# Patient Record
Sex: FEMALE | Race: WHITE | NOT HISPANIC OR LATINO | Employment: OTHER | URBAN - METROPOLITAN AREA
[De-identification: names, ages, dates, MRNs, and addresses within clinical notes are randomized per-mention and may not be internally consistent; named-entity substitution may affect disease eponyms.]

---

## 2017-05-31 ENCOUNTER — HISTORICAL (OUTPATIENT)
Dept: ADMINISTRATIVE | Facility: HOSPITAL | Age: 72
End: 2017-05-31

## 2017-05-31 LAB
ABS NEUT (OLG): 3.83 X10(3)/MCL (ref 2.1–9.2)
ALBUMIN SERPL-MCNC: 3.8 GM/DL (ref 3.4–5)
ALBUMIN/GLOB SERPL: 1.2 {RATIO}
ALP SERPL-CCNC: 95 UNIT/L (ref 38–126)
ALT SERPL-CCNC: 24 UNIT/L (ref 12–78)
APPEARANCE, UA: CLEAR
AST SERPL-CCNC: 14 UNIT/L (ref 15–37)
BACTERIA SPEC CULT: ABNORMAL /HPF
BILIRUB SERPL-MCNC: 0.6 MG/DL (ref 0.2–1)
BILIRUB UR QL STRIP: NEGATIVE
BILIRUBIN DIRECT+TOT PNL SERPL-MCNC: 0.1 MG/DL (ref 0–0.2)
BILIRUBIN DIRECT+TOT PNL SERPL-MCNC: 0.5 MG/DL (ref 0–0.8)
BUN SERPL-MCNC: 17 MG/DL (ref 7–18)
CALCIUM SERPL-MCNC: 9.5 MG/DL (ref 8.5–10.1)
CHLORIDE SERPL-SCNC: 105 MMOL/L (ref 98–107)
CHOLEST SERPL-MCNC: 225 MG/DL (ref 0–200)
CHOLEST/HDLC SERPL: 4.4 {RATIO} (ref 0–4)
CO2 SERPL-SCNC: 28 MMOL/L (ref 21–32)
COLOR UR: YELLOW
CREAT SERPL-MCNC: 0.85 MG/DL (ref 0.55–1.02)
DEPRECATED CALCIDIOL+CALCIFEROL SERPL-MC: 73.38 NG/ML (ref 30–80)
EOSINOPHIL NFR BLD MANUAL: 6 % (ref 0–8)
ERYTHROCYTE [DISTWIDTH] IN BLOOD BY AUTOMATED COUNT: 12.1 % (ref 11.5–17)
GLOBULIN SER-MCNC: 3.3 GM/DL (ref 2.4–3.5)
GLUCOSE (UA): NEGATIVE
GLUCOSE SERPL-MCNC: 94 MG/DL (ref 74–106)
HCT VFR BLD AUTO: 47.5 % (ref 37–47)
HDLC SERPL-MCNC: 51 MG/DL (ref 35–60)
HGB BLD-MCNC: 15.4 GM/DL (ref 12–16)
HGB UR QL STRIP: NEGATIVE
KETONES UR QL STRIP: NEGATIVE
LDLC SERPL CALC-MCNC: 142 MG/DL (ref 0–129)
LEUKOCYTE ESTERASE UR QL STRIP: ABNORMAL
LYMPHOCYTES NFR BLD MANUAL: 19 % (ref 13–40)
LYMPHOCYTES NFR BLD MANUAL: 9 %
MCH RBC QN AUTO: 31.5 PG (ref 27–31)
MCHC RBC AUTO-ENTMCNC: 32.4 GM/DL (ref 33–36)
MCV RBC AUTO: 97.1 FL (ref 80–94)
MONOCYTES NFR BLD MANUAL: 7 % (ref 2–11)
NEUTROPHILS NFR BLD MANUAL: 59 % (ref 47–80)
NITRITE UR QL STRIP: NEGATIVE
PH UR STRIP: 7.5 [PH] (ref 5–9)
PLATELET # BLD AUTO: 285 X10(3)/MCL (ref 130–400)
PLATELET # BLD EST: ABNORMAL 10*3/UL
PMV BLD AUTO: 10 FL (ref 7.4–10.4)
POTASSIUM SERPL-SCNC: 5.1 MMOL/L (ref 3.5–5.1)
PROT SERPL-MCNC: 7.1 GM/DL (ref 6.4–8.2)
PROT UR QL STRIP: NEGATIVE
RBC # BLD AUTO: 4.89 X10(6)/MCL (ref 4.2–5.4)
RBC #/AREA URNS HPF: ABNORMAL /HPF
SODIUM SERPL-SCNC: 141 MMOL/L (ref 136–145)
SP GR UR STRIP: 1.02 (ref 1–1.03)
SQUAMOUS EPITHELIAL, UA: ABNORMAL
TRIGL SERPL-MCNC: 161 MG/DL (ref 30–150)
TSH SERPL-ACNC: 1.63 MIU/ML (ref 0.36–3.74)
UA WBC MAN: ABNORMAL /HPF
UROBILINOGEN UR STRIP-ACNC: 0.2
VLDLC SERPL CALC-MCNC: 32 MG/DL
WBC # SPEC AUTO: 7.5 X10(3)/MCL (ref 4.5–11.5)

## 2017-06-14 ENCOUNTER — HISTORICAL (OUTPATIENT)
Dept: RADIOLOGY | Facility: HOSPITAL | Age: 72
End: 2017-06-14

## 2017-06-28 ENCOUNTER — HISTORICAL (OUTPATIENT)
Dept: ADMINISTRATIVE | Facility: HOSPITAL | Age: 72
End: 2017-06-28

## 2017-07-18 ENCOUNTER — HISTORICAL (OUTPATIENT)
Dept: ADMINISTRATIVE | Facility: HOSPITAL | Age: 72
End: 2017-07-18

## 2017-12-01 ENCOUNTER — HISTORICAL (OUTPATIENT)
Dept: ADMINISTRATIVE | Facility: HOSPITAL | Age: 72
End: 2017-12-01

## 2017-12-01 LAB
ABS NEUT (OLG): 3.76 X10(3)/MCL (ref 2.1–9.2)
ALBUMIN SERPL-MCNC: 3.8 GM/DL (ref 3.4–5)
ALBUMIN/GLOB SERPL: 1.1 {RATIO}
ALP SERPL-CCNC: 97 UNIT/L (ref 38–126)
ALT SERPL-CCNC: 39 UNIT/L (ref 12–78)
APPEARANCE, UA: CLEAR
AST SERPL-CCNC: 16 UNIT/L (ref 15–37)
BACTERIA SPEC CULT: NORMAL /HPF
BASOPHILS # BLD AUTO: 0 X10(3)/MCL (ref 0–0.2)
BASOPHILS NFR BLD AUTO: 1 %
BILIRUB SERPL-MCNC: 0.5 MG/DL (ref 0.2–1)
BILIRUB UR QL STRIP: NEGATIVE
BILIRUBIN DIRECT+TOT PNL SERPL-MCNC: 0.1 MG/DL (ref 0–0.2)
BILIRUBIN DIRECT+TOT PNL SERPL-MCNC: 0.4 MG/DL (ref 0–0.8)
BUN SERPL-MCNC: 17 MG/DL (ref 7–18)
CALCIUM SERPL-MCNC: 9.3 MG/DL (ref 8.5–10.1)
CHLORIDE SERPL-SCNC: 104 MMOL/L (ref 98–107)
CHOLEST SERPL-MCNC: 260 MG/DL (ref 0–200)
CHOLEST/HDLC SERPL: 4.7 {RATIO} (ref 0–4)
CO2 SERPL-SCNC: 28 MMOL/L (ref 21–32)
COLOR UR: YELLOW
CREAT SERPL-MCNC: 0.79 MG/DL (ref 0.55–1.02)
EOSINOPHIL # BLD AUTO: 0.1 X10(3)/MCL (ref 0–0.9)
EOSINOPHIL NFR BLD AUTO: 2 %
ERYTHROCYTE [DISTWIDTH] IN BLOOD BY AUTOMATED COUNT: 12.3 % (ref 11.5–17)
GLOBULIN SER-MCNC: 3.6 GM/DL (ref 2.4–3.5)
GLUCOSE (UA): NEGATIVE
GLUCOSE SERPL-MCNC: 95 MG/DL (ref 74–106)
HCT VFR BLD AUTO: 46 % (ref 37–47)
HDLC SERPL-MCNC: 55 MG/DL (ref 35–60)
HGB BLD-MCNC: 15.2 GM/DL (ref 12–16)
HGB UR QL STRIP: NEGATIVE
KETONES UR QL STRIP: NEGATIVE
LDLC SERPL CALC-MCNC: 173 MG/DL (ref 0–129)
LEUKOCYTE ESTERASE UR QL STRIP: NEGATIVE
LYMPHOCYTES # BLD AUTO: 2.5 X10(3)/MCL (ref 0.6–4.6)
LYMPHOCYTES NFR BLD AUTO: 36 %
MCH RBC QN AUTO: 31.2 PG (ref 27–31)
MCHC RBC AUTO-ENTMCNC: 33 GM/DL (ref 33–36)
MCV RBC AUTO: 94.5 FL (ref 80–94)
MONOCYTES # BLD AUTO: 0.6 X10(3)/MCL (ref 0.1–1.3)
MONOCYTES NFR BLD AUTO: 9 %
NEUTROPHILS # BLD AUTO: 3.76 X10(3)/MCL (ref 2.1–9.2)
NEUTROPHILS NFR BLD AUTO: 53 %
NITRITE UR QL STRIP: NEGATIVE
PH UR STRIP: 7.5 [PH] (ref 5–9)
PLATELET # BLD AUTO: 262 X10(3)/MCL (ref 130–400)
PMV BLD AUTO: 9.6 FL (ref 9.4–12.4)
POTASSIUM SERPL-SCNC: 4.7 MMOL/L (ref 3.5–5.1)
PROT SERPL-MCNC: 7.4 GM/DL (ref 6.4–8.2)
PROT UR QL STRIP: NEGATIVE
RBC # BLD AUTO: 4.87 X10(6)/MCL (ref 4.2–5.4)
RBC #/AREA URNS HPF: NORMAL /[HPF]
SODIUM SERPL-SCNC: 139 MMOL/L (ref 136–145)
SP GR UR STRIP: 1.01 (ref 1–1.03)
SQUAMOUS EPITHELIAL, UA: NORMAL
T4 FREE SERPL-MCNC: 1 NG/DL (ref 0.76–1.46)
TRIGL SERPL-MCNC: 158 MG/DL (ref 30–150)
TSH SERPL-ACNC: 2.17 MIU/ML (ref 0.36–3.74)
UROBILINOGEN UR STRIP-ACNC: 1
VLDLC SERPL CALC-MCNC: 32 MG/DL
WBC # SPEC AUTO: 7.1 X10(3)/MCL (ref 4.5–11.5)
WBC #/AREA URNS HPF: NORMAL /[HPF]

## 2017-12-11 ENCOUNTER — HISTORICAL (OUTPATIENT)
Dept: RADIOLOGY | Facility: HOSPITAL | Age: 72
End: 2017-12-11

## 2018-06-04 ENCOUNTER — HISTORICAL (OUTPATIENT)
Dept: ADMINISTRATIVE | Facility: HOSPITAL | Age: 73
End: 2018-06-04

## 2018-06-04 LAB
ABS NEUT (OLG): 3.56 X10(3)/MCL (ref 2.1–9.2)
ALBUMIN SERPL-MCNC: 3.9 GM/DL (ref 3.4–5)
ALBUMIN/GLOB SERPL: 1.1 {RATIO}
ALP SERPL-CCNC: 93 UNIT/L (ref 38–126)
ALT SERPL-CCNC: 31 UNIT/L (ref 12–78)
AST SERPL-CCNC: 16 UNIT/L (ref 15–37)
BASOPHILS # BLD AUTO: 0 X10(3)/MCL (ref 0–0.2)
BASOPHILS NFR BLD AUTO: 1 %
BILIRUB SERPL-MCNC: 0.6 MG/DL (ref 0.2–1)
BILIRUBIN DIRECT+TOT PNL SERPL-MCNC: 0.1 MG/DL (ref 0–0.2)
BILIRUBIN DIRECT+TOT PNL SERPL-MCNC: 0.5 MG/DL (ref 0–0.8)
BUN SERPL-MCNC: 15 MG/DL (ref 7–18)
CALCIUM SERPL-MCNC: 9.2 MG/DL (ref 8.5–10.1)
CHLORIDE SERPL-SCNC: 108 MMOL/L (ref 98–107)
CHOLEST SERPL-MCNC: 181 MG/DL (ref 0–200)
CHOLEST/HDLC SERPL: 3.4 {RATIO} (ref 0–4)
CO2 SERPL-SCNC: 28 MMOL/L (ref 21–32)
CREAT SERPL-MCNC: 0.78 MG/DL (ref 0.55–1.02)
EOSINOPHIL # BLD AUTO: 0.1 X10(3)/MCL (ref 0–0.9)
EOSINOPHIL NFR BLD AUTO: 2 %
ERYTHROCYTE [DISTWIDTH] IN BLOOD BY AUTOMATED COUNT: 12 % (ref 11.5–17)
GLOBULIN SER-MCNC: 3.4 GM/DL (ref 2.4–3.5)
GLUCOSE SERPL-MCNC: 88 MG/DL (ref 74–106)
HCT VFR BLD AUTO: 46.7 % (ref 37–47)
HDLC SERPL-MCNC: 53 MG/DL (ref 35–60)
HGB BLD-MCNC: 15.2 GM/DL (ref 12–16)
LDLC SERPL CALC-MCNC: 107 MG/DL (ref 0–129)
LYMPHOCYTES # BLD AUTO: 3.2 X10(3)/MCL (ref 0.6–4.6)
LYMPHOCYTES NFR BLD AUTO: 42 %
MCH RBC QN AUTO: 31.7 PG (ref 27–31)
MCHC RBC AUTO-ENTMCNC: 32.5 GM/DL (ref 33–36)
MCV RBC AUTO: 97.3 FL (ref 80–94)
MONOCYTES # BLD AUTO: 0.5 X10(3)/MCL (ref 0.1–1.3)
MONOCYTES NFR BLD AUTO: 7 %
NEUTROPHILS # BLD AUTO: 3.56 X10(3)/MCL (ref 1.4–7.9)
NEUTROPHILS NFR BLD AUTO: 48 %
PLATELET # BLD AUTO: 285 X10(3)/MCL (ref 130–400)
PMV BLD AUTO: 9.7 FL (ref 9.4–12.4)
POTASSIUM SERPL-SCNC: 4.9 MMOL/L (ref 3.5–5.1)
PROT SERPL-MCNC: 7.3 GM/DL (ref 6.4–8.2)
RBC # BLD AUTO: 4.8 X10(6)/MCL (ref 4.2–5.4)
SODIUM SERPL-SCNC: 142 MMOL/L (ref 136–145)
T4 FREE SERPL-MCNC: 0.84 NG/DL (ref 0.76–1.46)
TRIGL SERPL-MCNC: 105 MG/DL (ref 30–150)
TSH SERPL-ACNC: 0.86 MIU/L (ref 0.36–3.74)
VLDLC SERPL CALC-MCNC: 21 MG/DL
WBC # SPEC AUTO: 7.4 X10(3)/MCL (ref 4.5–11.5)

## 2018-09-07 ENCOUNTER — HISTORICAL (OUTPATIENT)
Dept: RADIOLOGY | Facility: HOSPITAL | Age: 73
End: 2018-09-07

## 2018-09-07 LAB — BMD RECOMMENDATION EXT: NORMAL

## 2018-12-04 ENCOUNTER — HISTORICAL (OUTPATIENT)
Dept: ADMINISTRATIVE | Facility: HOSPITAL | Age: 73
End: 2018-12-04

## 2018-12-04 LAB
ABS NEUT (OLG): 4.18 X10(3)/MCL (ref 2.1–9.2)
ALBUMIN SERPL-MCNC: 4 GM/DL (ref 3.4–5)
ALBUMIN/GLOB SERPL: 1.1 {RATIO}
ALP SERPL-CCNC: 91 UNIT/L (ref 38–126)
ALT SERPL-CCNC: 30 UNIT/L (ref 12–78)
APPEARANCE, UA: CLEAR
AST SERPL-CCNC: 18 UNIT/L (ref 15–37)
BACTERIA SPEC CULT: ABNORMAL /HPF
BASOPHILS # BLD AUTO: 0.1 X10(3)/MCL (ref 0–0.2)
BASOPHILS NFR BLD AUTO: 1 %
BILIRUB SERPL-MCNC: 0.8 MG/DL (ref 0.2–1)
BILIRUB UR QL STRIP: NEGATIVE
BILIRUBIN DIRECT+TOT PNL SERPL-MCNC: 0.2 MG/DL (ref 0–0.2)
BILIRUBIN DIRECT+TOT PNL SERPL-MCNC: 0.6 MG/DL (ref 0–0.8)
BUN SERPL-MCNC: 13 MG/DL (ref 7–18)
CALCIUM SERPL-MCNC: 9.4 MG/DL (ref 8.5–10.1)
CHLORIDE SERPL-SCNC: 104 MMOL/L (ref 98–107)
CHOLEST SERPL-MCNC: 184 MG/DL (ref 0–200)
CHOLEST/HDLC SERPL: 3.1 {RATIO} (ref 0–4)
CO2 SERPL-SCNC: 27 MMOL/L (ref 21–32)
COLOR UR: YELLOW
CREAT SERPL-MCNC: 0.89 MG/DL (ref 0.55–1.02)
EOSINOPHIL # BLD AUTO: 0.1 X10(3)/MCL (ref 0–0.9)
EOSINOPHIL NFR BLD AUTO: 2 %
ERYTHROCYTE [DISTWIDTH] IN BLOOD BY AUTOMATED COUNT: 12.1 % (ref 11.5–17)
GLOBULIN SER-MCNC: 3.5 GM/DL (ref 2.4–3.5)
GLUCOSE (UA): NEGATIVE
GLUCOSE SERPL-MCNC: 90 MG/DL (ref 74–106)
HCT VFR BLD AUTO: 47.2 % (ref 37–47)
HDLC SERPL-MCNC: 60 MG/DL (ref 35–60)
HGB BLD-MCNC: 15.3 GM/DL (ref 12–16)
HGB UR QL STRIP: NEGATIVE
KETONES UR QL STRIP: NEGATIVE
LDLC SERPL CALC-MCNC: 100 MG/DL (ref 0–129)
LEUKOCYTE ESTERASE UR QL STRIP: ABNORMAL
LYMPHOCYTES # BLD AUTO: 3.6 X10(3)/MCL (ref 0.6–4.6)
LYMPHOCYTES NFR BLD AUTO: 42 %
MCH RBC QN AUTO: 31.7 PG (ref 27–31)
MCHC RBC AUTO-ENTMCNC: 32.4 GM/DL (ref 33–36)
MCV RBC AUTO: 97.7 FL (ref 80–94)
MONOCYTES # BLD AUTO: 0.6 X10(3)/MCL (ref 0.1–1.3)
MONOCYTES NFR BLD AUTO: 7 %
NEUTROPHILS # BLD AUTO: 4.18 X10(3)/MCL (ref 2.1–9.2)
NEUTROPHILS NFR BLD AUTO: 49 %
NITRITE UR QL STRIP: NEGATIVE
PH UR STRIP: 8.5 [PH] (ref 5–9)
PLATELET # BLD AUTO: 274 X10(3)/MCL (ref 130–400)
PMV BLD AUTO: 9.2 FL (ref 9.4–12.4)
POTASSIUM SERPL-SCNC: 4.1 MMOL/L (ref 3.5–5.1)
PROT SERPL-MCNC: 7.5 GM/DL (ref 6.4–8.2)
PROT UR QL STRIP: NEGATIVE
RBC # BLD AUTO: 4.83 X10(6)/MCL (ref 4.2–5.4)
RBC #/AREA URNS HPF: ABNORMAL /[HPF]
SODIUM SERPL-SCNC: 139 MMOL/L (ref 136–145)
SP GR UR STRIP: 1.01 (ref 1–1.03)
SQUAMOUS EPITHELIAL, UA: ABNORMAL
T4 FREE SERPL-MCNC: 0.92 NG/DL (ref 0.76–1.46)
TRIGL SERPL-MCNC: 122 MG/DL (ref 30–150)
TSH SERPL-ACNC: 1.97 MIU/L (ref 0.36–3.74)
UROBILINOGEN UR STRIP-ACNC: 0.2
VLDLC SERPL CALC-MCNC: 24 MG/DL
WBC # SPEC AUTO: 8.6 X10(3)/MCL (ref 4.5–11.5)
WBC #/AREA URNS HPF: ABNORMAL /[HPF]

## 2019-01-15 ENCOUNTER — HISTORICAL (OUTPATIENT)
Dept: ADMINISTRATIVE | Facility: HOSPITAL | Age: 74
End: 2019-01-15

## 2019-03-07 ENCOUNTER — HISTORICAL (OUTPATIENT)
Dept: ADMINISTRATIVE | Facility: HOSPITAL | Age: 74
End: 2019-03-07

## 2019-03-25 ENCOUNTER — HISTORICAL (OUTPATIENT)
Dept: RADIOLOGY | Facility: HOSPITAL | Age: 74
End: 2019-03-25

## 2019-06-13 ENCOUNTER — HISTORICAL (OUTPATIENT)
Dept: ADMINISTRATIVE | Facility: HOSPITAL | Age: 74
End: 2019-06-13

## 2019-06-13 LAB
ABS NEUT (OLG): 2.9 X10(3)/MCL (ref 2.1–9.2)
ALBUMIN SERPL-MCNC: 3.7 GM/DL (ref 3.4–5)
ALBUMIN/GLOB SERPL: 1.1 RATIO (ref 1.1–2)
ALP SERPL-CCNC: 92 UNIT/L (ref 38–126)
ALT SERPL-CCNC: 19 UNIT/L (ref 12–78)
APPEARANCE, UA: CLEAR
AST SERPL-CCNC: 13 UNIT/L (ref 15–37)
BACTERIA SPEC CULT: ABNORMAL /HPF
BASOPHILS # BLD AUTO: 0 X10(3)/MCL (ref 0–0.2)
BASOPHILS NFR BLD AUTO: 1 %
BILIRUB SERPL-MCNC: 0.5 MG/DL (ref 0.2–1)
BILIRUB UR QL STRIP: NEGATIVE
BILIRUBIN DIRECT+TOT PNL SERPL-MCNC: 0.1 MG/DL (ref 0–0.5)
BILIRUBIN DIRECT+TOT PNL SERPL-MCNC: 0.4 MG/DL (ref 0–0.8)
BUN SERPL-MCNC: 11 MG/DL (ref 7–18)
CALCIUM SERPL-MCNC: 9.7 MG/DL (ref 8.5–10.1)
CHLORIDE SERPL-SCNC: 108 MMOL/L (ref 98–107)
CHOLEST SERPL-MCNC: 137 MG/DL (ref 0–200)
CHOLEST/HDLC SERPL: 2.6 {RATIO} (ref 0–4)
CO2 SERPL-SCNC: 28 MMOL/L (ref 21–32)
COLOR UR: YELLOW
CREAT SERPL-MCNC: 0.73 MG/DL (ref 0.55–1.02)
EOSINOPHIL # BLD AUTO: 0.2 X10(3)/MCL (ref 0–0.9)
EOSINOPHIL NFR BLD AUTO: 2 %
ERYTHROCYTE [DISTWIDTH] IN BLOOD BY AUTOMATED COUNT: 12.4 % (ref 11.5–17)
GLOBULIN SER-MCNC: 3.3 GM/DL (ref 2.4–3.5)
GLUCOSE (UA): NEGATIVE
GLUCOSE SERPL-MCNC: 91 MG/DL (ref 74–106)
HCT VFR BLD AUTO: 44.5 % (ref 37–47)
HDLC SERPL-MCNC: 53 MG/DL (ref 35–60)
HGB BLD-MCNC: 14.2 GM/DL (ref 12–16)
HGB UR QL STRIP: NEGATIVE
KETONES UR QL STRIP: NEGATIVE
LDLC SERPL CALC-MCNC: 65 MG/DL (ref 0–129)
LEUKOCYTE ESTERASE UR QL STRIP: ABNORMAL
LYMPHOCYTES # BLD AUTO: 2.6 X10(3)/MCL (ref 0.6–4.6)
LYMPHOCYTES NFR BLD AUTO: 42 %
MCH RBC QN AUTO: 30.5 PG (ref 27–31)
MCHC RBC AUTO-ENTMCNC: 31.9 GM/DL (ref 33–36)
MCV RBC AUTO: 95.7 FL (ref 80–94)
MONOCYTES # BLD AUTO: 0.5 X10(3)/MCL (ref 0.1–1.3)
MONOCYTES NFR BLD AUTO: 8 %
NEUTROPHILS # BLD AUTO: 2.9 X10(3)/MCL (ref 2.1–9.2)
NEUTROPHILS NFR BLD AUTO: 46 %
NITRITE UR QL STRIP: NEGATIVE
PH UR STRIP: 6.5 [PH] (ref 5–9)
PLATELET # BLD AUTO: 273 X10(3)/MCL (ref 130–400)
PMV BLD AUTO: 9.8 FL (ref 9.4–12.4)
POTASSIUM SERPL-SCNC: 4.4 MMOL/L (ref 3.5–5.1)
PROT SERPL-MCNC: 7 GM/DL (ref 6.4–8.2)
PROT UR QL STRIP: NEGATIVE
RBC # BLD AUTO: 4.65 X10(6)/MCL (ref 4.2–5.4)
RBC #/AREA URNS HPF: ABNORMAL /[HPF]
SODIUM SERPL-SCNC: 141 MMOL/L (ref 136–145)
SP GR UR STRIP: 1.02 (ref 1–1.03)
SQUAMOUS EPITHELIAL, UA: ABNORMAL
T4 FREE SERPL-MCNC: 0.92 NG/DL (ref 0.76–1.46)
TRIGL SERPL-MCNC: 97 MG/DL (ref 30–150)
TSH SERPL-ACNC: 1.12 MIU/L (ref 0.36–3.74)
UROBILINOGEN UR STRIP-ACNC: 1
VLDLC SERPL CALC-MCNC: 19 MG/DL
WBC # SPEC AUTO: 6.2 X10(3)/MCL (ref 4.5–11.5)
WBC #/AREA URNS HPF: ABNORMAL /[HPF]

## 2019-12-16 ENCOUNTER — HISTORICAL (OUTPATIENT)
Dept: ADMINISTRATIVE | Facility: HOSPITAL | Age: 74
End: 2019-12-16

## 2019-12-16 LAB
ABS NEUT (OLG): 3.57 X10(3)/MCL (ref 2.1–9.2)
ALBUMIN SERPL-MCNC: 3.4 GM/DL (ref 3.4–5)
ALBUMIN/GLOB SERPL: 1 RATIO (ref 1.1–2)
ALP SERPL-CCNC: 101 UNIT/L (ref 38–126)
ALT SERPL-CCNC: 25 UNIT/L (ref 12–78)
AST SERPL-CCNC: 12 UNIT/L (ref 15–37)
BASOPHILS # BLD AUTO: 0 X10(3)/MCL (ref 0–0.2)
BASOPHILS NFR BLD AUTO: 1 %
BILIRUB SERPL-MCNC: 0.5 MG/DL (ref 0.2–1)
BILIRUBIN DIRECT+TOT PNL SERPL-MCNC: 0.1 MG/DL (ref 0–0.5)
BILIRUBIN DIRECT+TOT PNL SERPL-MCNC: 0.4 MG/DL (ref 0–0.8)
BUN SERPL-MCNC: 15 MG/DL (ref 7–18)
CALCIUM SERPL-MCNC: 9.4 MG/DL (ref 8.5–10.1)
CHLORIDE SERPL-SCNC: 108 MMOL/L (ref 98–107)
CHOLEST SERPL-MCNC: 132 MG/DL (ref 0–200)
CHOLEST/HDLC SERPL: 2.5 {RATIO} (ref 0–4)
CO2 SERPL-SCNC: 27 MMOL/L (ref 21–32)
CREAT SERPL-MCNC: 0.85 MG/DL (ref 0.55–1.02)
EOSINOPHIL # BLD AUTO: 0.1 X10(3)/MCL (ref 0–0.9)
EOSINOPHIL NFR BLD AUTO: 2 %
ERYTHROCYTE [DISTWIDTH] IN BLOOD BY AUTOMATED COUNT: 12.3 % (ref 11.5–17)
GLOBULIN SER-MCNC: 3.3 GM/DL (ref 2.4–3.5)
GLUCOSE SERPL-MCNC: 101 MG/DL (ref 74–106)
HCT VFR BLD AUTO: 45.7 % (ref 37–47)
HDLC SERPL-MCNC: 52 MG/DL (ref 35–60)
HGB BLD-MCNC: 14.7 GM/DL (ref 12–16)
IMM GRANULOCYTES # BLD AUTO: 0 10*3/UL
IMM GRANULOCYTES NFR BLD AUTO: 0 %
LDLC SERPL CALC-MCNC: 62 MG/DL (ref 0–129)
LYMPHOCYTES # BLD AUTO: 2.6 X10(3)/MCL (ref 0.6–4.6)
LYMPHOCYTES NFR BLD AUTO: 38 %
MCH RBC QN AUTO: 30.8 PG (ref 27–31)
MCHC RBC AUTO-ENTMCNC: 32.2 GM/DL (ref 33–36)
MCV RBC AUTO: 95.6 FL (ref 80–94)
MONOCYTES # BLD AUTO: 0.5 X10(3)/MCL (ref 0.1–1.3)
MONOCYTES NFR BLD AUTO: 7 %
NEUTROPHILS # BLD AUTO: 3.57 X10(3)/MCL (ref 2.1–9.2)
NEUTROPHILS NFR BLD AUTO: 53 %
PLATELET # BLD AUTO: 302 X10(3)/MCL (ref 130–400)
PMV BLD AUTO: 9.6 FL (ref 9.4–12.4)
POTASSIUM SERPL-SCNC: 4.8 MMOL/L (ref 3.5–5.1)
PROT SERPL-MCNC: 6.7 GM/DL (ref 6.4–8.2)
RBC # BLD AUTO: 4.78 X10(6)/MCL (ref 4.2–5.4)
SODIUM SERPL-SCNC: 141 MMOL/L (ref 136–145)
T4 FREE SERPL-MCNC: 0.86 NG/DL (ref 0.76–1.46)
TRIGL SERPL-MCNC: 91 MG/DL (ref 30–150)
TSH SERPL-ACNC: 1.78 MIU/L (ref 0.36–3.74)
VLDLC SERPL CALC-MCNC: 18 MG/DL
WBC # SPEC AUTO: 6.8 X10(3)/MCL (ref 4.5–11.5)

## 2020-06-15 ENCOUNTER — HISTORICAL (OUTPATIENT)
Dept: ADMINISTRATIVE | Facility: HOSPITAL | Age: 75
End: 2020-06-15

## 2020-06-15 LAB
ABS NEUT (OLG): 3.64 X10(3)/MCL (ref 2.1–9.2)
ALBUMIN SERPL-MCNC: 4.1 GM/DL (ref 3.4–4.8)
ALBUMIN/GLOB SERPL: 1.4 RATIO (ref 1.1–2)
ALP SERPL-CCNC: 99 UNIT/L (ref 40–150)
ALT SERPL-CCNC: 23 UNIT/L (ref 0–55)
APPEARANCE, UA: CLEAR
AST SERPL-CCNC: 23 UNIT/L (ref 5–34)
BACTERIA SPEC CULT: ABNORMAL /HPF
BASOPHILS # BLD AUTO: 0 X10(3)/MCL (ref 0–0.2)
BASOPHILS NFR BLD AUTO: 0 %
BILIRUB SERPL-MCNC: 0.7 MG/DL
BILIRUB UR QL STRIP: NEGATIVE
BILIRUBIN DIRECT+TOT PNL SERPL-MCNC: 0.3 MG/DL (ref 0–0.5)
BILIRUBIN DIRECT+TOT PNL SERPL-MCNC: 0.4 MG/DL (ref 0–0.8)
BUN SERPL-MCNC: 10.4 MG/DL (ref 9.8–20.1)
CALCIUM SERPL-MCNC: 9.5 MG/DL (ref 8.4–10.2)
CHLORIDE SERPL-SCNC: 106 MMOL/L (ref 98–107)
CHOLEST SERPL-MCNC: 159 MG/DL
CHOLEST/HDLC SERPL: 3 {RATIO} (ref 0–5)
CO2 SERPL-SCNC: 28 MMOL/L (ref 23–31)
COLOR UR: YELLOW
CREAT SERPL-MCNC: 0.79 MG/DL (ref 0.55–1.02)
EOSINOPHIL # BLD AUTO: 0.1 X10(3)/MCL (ref 0–0.9)
EOSINOPHIL NFR BLD AUTO: 2 %
ERYTHROCYTE [DISTWIDTH] IN BLOOD BY AUTOMATED COUNT: 12.2 % (ref 11.5–17)
GLOBULIN SER-MCNC: 3 GM/DL (ref 2.4–3.5)
GLUCOSE (UA): NEGATIVE
GLUCOSE SERPL-MCNC: 95 MG/DL (ref 82–115)
HCT VFR BLD AUTO: 46 % (ref 37–47)
HDLC SERPL-MCNC: 48 MG/DL (ref 35–60)
HGB BLD-MCNC: 15 GM/DL (ref 12–16)
HGB UR QL STRIP: NEGATIVE
KETONES UR QL STRIP: NEGATIVE
LDLC SERPL CALC-MCNC: 85 MG/DL (ref 50–140)
LEUKOCYTE ESTERASE UR QL STRIP: ABNORMAL
LYMPHOCYTES # BLD AUTO: 3.2 X10(3)/MCL (ref 0.6–4.6)
LYMPHOCYTES NFR BLD AUTO: 43 %
MCH RBC QN AUTO: 31.4 PG (ref 27–31)
MCHC RBC AUTO-ENTMCNC: 32.6 GM/DL (ref 33–36)
MCV RBC AUTO: 96.2 FL (ref 80–94)
MONOCYTES # BLD AUTO: 0.4 X10(3)/MCL (ref 0.1–1.3)
MONOCYTES NFR BLD AUTO: 6 %
NEUTROPHILS # BLD AUTO: 3.64 X10(3)/MCL (ref 2.1–9.2)
NEUTROPHILS NFR BLD AUTO: 49 %
NITRITE UR QL STRIP: NEGATIVE
PH UR STRIP: >=9 [PH] (ref 5–9)
PLATELET # BLD AUTO: 266 X10(3)/MCL (ref 130–400)
PMV BLD AUTO: 9.5 FL (ref 9.4–12.4)
POTASSIUM SERPL-SCNC: 4.5 MMOL/L (ref 3.5–5.1)
PROT SERPL-MCNC: 7.1 GM/DL (ref 5.8–7.6)
PROT UR QL STRIP: NEGATIVE
RBC # BLD AUTO: 4.78 X10(6)/MCL (ref 4.2–5.4)
RBC #/AREA URNS HPF: ABNORMAL /[HPF]
SODIUM SERPL-SCNC: 141 MMOL/L (ref 136–145)
SP GR UR STRIP: 1.02 (ref 1–1.03)
SQUAMOUS EPITHELIAL, UA: ABNORMAL
T4 FREE SERPL-MCNC: 0.82 NG/DL (ref 0.7–1.48)
TRIGL SERPL-MCNC: 130 MG/DL (ref 37–140)
TSH SERPL-ACNC: 1.92 UIU/ML (ref 0.35–4.94)
UROBILINOGEN UR STRIP-ACNC: 1
VLDLC SERPL CALC-MCNC: 26 MG/DL
WBC # SPEC AUTO: 7.4 X10(3)/MCL (ref 4.5–11.5)
WBC #/AREA URNS HPF: 23 /HPF (ref 0–3)

## 2020-06-17 LAB — FINAL CULTURE: NORMAL

## 2021-01-05 ENCOUNTER — HISTORICAL (OUTPATIENT)
Dept: LAB | Facility: HOSPITAL | Age: 76
End: 2021-01-05

## 2021-01-05 LAB
ABS NEUT (OLG): 4.4
APPEARANCE, UA: CLEAR
BACTERIA SPEC CULT: ABNORMAL
BASOPHILS # BLD AUTO: 0.04 X10(3)/MCL
BASOPHILS NFR BLD AUTO: 0.5 %
BILIRUB UR QL STRIP: NEGATIVE
CHOLEST SERPL-MCNC: 187 MG/DL
CHOLEST/HDLC SERPL: 4 {RATIO} (ref 0–5)
COLOR UR: YELLOW
EOSINOPHIL # BLD AUTO: 0.16 X10(3)/MCL
EOSINOPHIL NFR BLD AUTO: 1.9 %
ERYTHROCYTE [DISTWIDTH] IN BLOOD BY AUTOMATED COUNT: 13 %
GLUCOSE (UA): NEGATIVE
HCT VFR BLD AUTO: 48.8 % (ref 34–46)
HDLC SERPL-MCNC: 47 MG/DL (ref 35–60)
HGB BLD-MCNC: 15.6 GM/DL (ref 11.3–15.4)
HGB UR QL STRIP: NEGATIVE
IMM GRANULOCYTES # BLD AUTO: 0.02 10*3/UL (ref 0–0.1)
IMM GRANULOCYTES NFR BLD AUTO: 0.2 % (ref 0–1)
KETONES UR QL STRIP: NEGATIVE
LDLC SERPL CALC-MCNC: 107 MG/DL (ref 50–140)
LEUKOCYTE ESTERASE UR QL STRIP: NEGATIVE
LYMPHOCYTES # BLD AUTO: 3.16 X10(3)/MCL
LYMPHOCYTES NFR BLD AUTO: 38.2 %
MCH RBC QN AUTO: 31.3 PG (ref 27–33)
MCHC RBC AUTO-ENTMCNC: 32 GM/DL (ref 32–35)
MCV RBC AUTO: 97.8 FL (ref 81–97)
MONOCYTES # BLD AUTO: 0.5 X10(3)/MCL
MONOCYTES NFR BLD AUTO: 6 %
NEUTROPHILS # BLD AUTO: 4.4 X10(3)/MCL
NEUTROPHILS NFR BLD AUTO: 53.2 %
NITRITE UR QL STRIP: NEGATIVE
PH UR STRIP: 8 [PH] (ref 4.6–8)
PLATELET # BLD AUTO: 290 X10(3)/MCL (ref 140–450)
PMV BLD AUTO: 10 FL
PROT UR QL STRIP: NEGATIVE
RBC # BLD AUTO: 4.99 X10(6)/MCL (ref 3.9–5)
RBC #/AREA URNS HPF: ABNORMAL /[HPF]
SP GR UR STRIP: 1.02 (ref 1–1.03)
SQUAMOUS EPITHELIAL, UA: ABNORMAL
TRIGL SERPL-MCNC: 166 MG/DL (ref 37–140)
UROBILINOGEN UR STRIP-ACNC: 0.2
VLDLC SERPL CALC-MCNC: 33 MG/DL
WBC # SPEC AUTO: 8.28 X10(3)/MCL (ref 3.4–9.2)
WBC #/AREA URNS HPF: ABNORMAL /HPF

## 2021-01-11 ENCOUNTER — HISTORICAL (OUTPATIENT)
Dept: LAB | Facility: HOSPITAL | Age: 76
End: 2021-01-11

## 2021-01-11 LAB
ALBUMIN SERPL-MCNC: 4.3 GM/DL (ref 3.4–4.8)
ALBUMIN/GLOB SERPL: 1.1 RATIO (ref 1.1–2)
ALP SERPL-CCNC: 107 UNIT/L (ref 40–150)
ALT SERPL-CCNC: 22 UNIT/L (ref 0–55)
AST SERPL-CCNC: 20 UNIT/L (ref 5–34)
BILIRUB SERPL-MCNC: 0.6 MG/DL
BILIRUBIN DIRECT+TOT PNL SERPL-MCNC: 0.3 MG/DL
BILIRUBIN DIRECT+TOT PNL SERPL-MCNC: 0.3 MG/DL (ref 0–0.5)
BUN SERPL-MCNC: 11 MG/DL (ref 9.8–20.1)
CALCIUM SERPL-MCNC: 10 MG/DL (ref 8.4–10.2)
CHLORIDE SERPL-SCNC: 105 MMOL/L (ref 98–107)
CO2 SERPL-SCNC: 27 MEQ/L (ref 23–31)
CREAT SERPL-MCNC: 0.84 MG/DL (ref 0.55–1.02)
DEPRECATED CALCIDIOL+CALCIFEROL SERPL-MC: 82.8 NG/ML (ref 30–80)
GLOBULIN SER-MCNC: 3.8 GM/DL (ref 2.4–3.5)
GLUCOSE SERPL-MCNC: 91 MG/DL (ref 82–115)
MAGNESIUM SERPL-MCNC: 2.35 MG/DL (ref 1.6–2.6)
POTASSIUM SERPL-SCNC: 4.5 MMOL/L (ref 3.5–5.1)
PROT SERPL-MCNC: 8.1 GM/DL (ref 5.8–7.6)
SODIUM SERPL-SCNC: 142 MMOL/L (ref 136–145)
TSH SERPL-ACNC: 3.05 UIU/ML (ref 0.35–4.94)
VIT B12 SERPL-MCNC: 554 PG/ML (ref 213–816)

## 2021-03-11 ENCOUNTER — HISTORICAL (OUTPATIENT)
Dept: RADIOLOGY | Facility: HOSPITAL | Age: 76
End: 2021-03-11

## 2021-03-11 LAB — BMD RECOMMENDATION EXT: NORMAL

## 2021-03-16 ENCOUNTER — HISTORICAL (OUTPATIENT)
Dept: RADIOLOGY | Facility: HOSPITAL | Age: 76
End: 2021-03-16

## 2021-03-16 LAB
ABS NEUT (OLG): 3.82
ALBUMIN SERPL-MCNC: 4.1 GM/DL (ref 3.4–4.8)
ALBUMIN/GLOB SERPL: 1.2 RATIO (ref 1.1–2)
ALP SERPL-CCNC: 89 UNIT/L (ref 40–150)
ALT SERPL-CCNC: 26 UNIT/L (ref 0–55)
APPEARANCE, UA: CLEAR
AST SERPL-CCNC: 21 UNIT/L (ref 5–34)
BACTERIA SPEC CULT: ABNORMAL
BASOPHILS # BLD AUTO: 0.03 X10(3)/MCL
BASOPHILS NFR BLD AUTO: 0.4 %
BILIRUB SERPL-MCNC: 0.5 MG/DL
BILIRUB UR QL STRIP: NEGATIVE
BILIRUBIN DIRECT+TOT PNL SERPL-MCNC: 0.2 MG/DL (ref 0–0.5)
BILIRUBIN DIRECT+TOT PNL SERPL-MCNC: 0.3 MG/DL
BUN SERPL-MCNC: 11 MG/DL (ref 9.8–20.1)
CALCIUM SERPL-MCNC: 9.7 MG/DL (ref 8.4–10.2)
CHLORIDE SERPL-SCNC: 106 MMOL/L (ref 98–107)
CO2 SERPL-SCNC: 26 MEQ/L (ref 23–31)
COLOR UR: YELLOW
CREAT SERPL-MCNC: 0.8 MG/DL (ref 0.55–1.02)
CRP SERPL-MCNC: 1.09 MG/DL
EOSINOPHIL # BLD AUTO: 0.17 X10(3)/MCL
EOSINOPHIL NFR BLD AUTO: 2.2 %
ERYTHROCYTE [DISTWIDTH] IN BLOOD BY AUTOMATED COUNT: 12 %
GLOBULIN SER-MCNC: 3.4 GM/DL (ref 2.4–3.5)
GLUCOSE (UA): NEGATIVE
GLUCOSE SERPL-MCNC: 92 MG/DL (ref 82–115)
HCT VFR BLD AUTO: 45.1 % (ref 34–46)
HGB BLD-MCNC: 14.8 GM/DL (ref 11.3–15.4)
HGB UR QL STRIP: NEGATIVE
IMM GRANULOCYTES # BLD AUTO: 0.02 10*3/UL (ref 0–0.1)
IMM GRANULOCYTES NFR BLD AUTO: 0.3 % (ref 0–1)
KETONES UR QL STRIP: NEGATIVE
LEUKOCYTE ESTERASE UR QL STRIP: ABNORMAL
LYMPHOCYTES # BLD AUTO: 3.15 X10(3)/MCL
LYMPHOCYTES NFR BLD AUTO: 40.3 %
MAGNESIUM SERPL-MCNC: 2.3 MG/DL (ref 1.6–2.6)
MCH RBC QN AUTO: 31.6 PG (ref 27–33)
MCHC RBC AUTO-ENTMCNC: 32.8 GM/DL (ref 32–35)
MCV RBC AUTO: 96.4 FL (ref 81–97)
MONOCYTES # BLD AUTO: 0.63 X10(3)/MCL
MONOCYTES NFR BLD AUTO: 8.1 %
NEUTROPHILS # BLD AUTO: 3.82 X10(3)/MCL
NEUTROPHILS NFR BLD AUTO: 48.7 %
NITRITE UR QL STRIP: NEGATIVE
PH UR STRIP: 6 [PH] (ref 4.6–8)
PHOSPHATE SERPL-MCNC: 3.2 MG/DL (ref 2.3–4.7)
PLATELET # BLD AUTO: 272 X10(3)/MCL (ref 140–450)
PMV BLD AUTO: 10 FL
POTASSIUM SERPL-SCNC: 3.9 MMOL/L (ref 3.5–5.1)
PROT SERPL-MCNC: 7.5 GM/DL (ref 5.8–7.6)
PROT UR QL STRIP: NEGATIVE
RBC # BLD AUTO: 4.68 X10(6)/MCL (ref 3.9–5)
RBC #/AREA URNS HPF: ABNORMAL /[HPF]
SODIUM SERPL-SCNC: 142 MMOL/L (ref 136–145)
SP GR UR STRIP: 1.01 (ref 1–1.03)
SQUAMOUS EPITHELIAL, UA: ABNORMAL
TSH SERPL-ACNC: 1.13 UIU/ML (ref 0.35–4.94)
UROBILINOGEN UR STRIP-ACNC: 0.2
WBC # SPEC AUTO: 7.82 X10(3)/MCL (ref 3.4–9.2)
WBC #/AREA URNS HPF: ABNORMAL /HPF

## 2021-03-25 ENCOUNTER — HISTORICAL (OUTPATIENT)
Dept: RADIOLOGY | Facility: HOSPITAL | Age: 76
End: 2021-03-25

## 2021-03-26 ENCOUNTER — HISTORICAL (OUTPATIENT)
Dept: LAB | Facility: HOSPITAL | Age: 76
End: 2021-03-26

## 2021-03-26 LAB
NEG CONT SPOT COUNT: NORMAL
PANEL A SPOT COUNT: 0
PANEL B SPOT COUNT: 0
POS CONT SPOT COUNT: NORMAL
T-SPOT.TB: NORMAL

## 2021-09-03 ENCOUNTER — HISTORICAL (OUTPATIENT)
Dept: LAB | Facility: HOSPITAL | Age: 76
End: 2021-09-03

## 2021-09-03 LAB
FLUAV AG UPPER RESP QL IA.RAPID: NEGATIVE
FLUBV AG UPPER RESP QL IA.RAPID: NEGATIVE
SARS-COV-2 RNA RESP QL NAA+PROBE: NOT DETECTED

## 2021-10-05 ENCOUNTER — HISTORICAL (OUTPATIENT)
Dept: LAB | Facility: HOSPITAL | Age: 76
End: 2021-10-05

## 2021-10-05 LAB
ALBUMIN SERPL-MCNC: 3.8 GM/DL (ref 3.4–4.8)
ALBUMIN/GLOB SERPL: 1.2 RATIO (ref 1.1–2)
ALP SERPL-CCNC: 61 UNIT/L (ref 40–150)
ALT SERPL-CCNC: 26 UNIT/L (ref 0–55)
AST SERPL-CCNC: 24 UNIT/L (ref 5–34)
BILIRUB SERPL-MCNC: 0.6 MG/DL
BILIRUBIN DIRECT+TOT PNL SERPL-MCNC: 0.2 MG/DL (ref 0–0.5)
BILIRUBIN DIRECT+TOT PNL SERPL-MCNC: 0.4 MG/DL
BUN SERPL-MCNC: 10 MG/DL (ref 9.8–20.1)
CALCIUM SERPL-MCNC: 9.6 MG/DL (ref 8.4–10.2)
CHLORIDE SERPL-SCNC: 107 MMOL/L (ref 98–107)
CO2 SERPL-SCNC: 25 MEQ/L (ref 23–31)
CREAT SERPL-MCNC: 0.97 MG/DL (ref 0.55–1.02)
GLOBULIN SER-MCNC: 3.3 GM/DL (ref 2.4–3.5)
GLUCOSE SERPL-MCNC: 119 MG/DL (ref 82–115)
MAGNESIUM SERPL-MCNC: 2.5 MG/DL (ref 1.6–2.6)
PHOSPHATE SERPL-MCNC: 2.6 MG/DL (ref 2.3–4.7)
POTASSIUM SERPL-SCNC: 4.4 MMOL/L (ref 3.5–5.1)
PROT SERPL-MCNC: 7.1 GM/DL (ref 5.8–7.6)
SODIUM SERPL-SCNC: 141 MMOL/L (ref 136–145)

## 2021-12-06 ENCOUNTER — HISTORICAL (OUTPATIENT)
Dept: LAB | Facility: HOSPITAL | Age: 76
End: 2021-12-06

## 2021-12-06 LAB
APPEARANCE, UA: CLEAR
BACTERIA SPEC CULT: ABNORMAL
BILIRUB UR QL STRIP: NEGATIVE
COLOR UR: YELLOW
GLUCOSE (UA): NEGATIVE
HGB UR QL STRIP: NEGATIVE
KETONES UR QL STRIP: NEGATIVE
LEUKOCYTE ESTERASE UR QL STRIP: ABNORMAL
NITRITE UR QL STRIP: NEGATIVE
PH UR STRIP: 7.5 [PH] (ref 4.6–8)
PROT UR QL STRIP: NEGATIVE
RBC #/AREA URNS HPF: ABNORMAL /HPF
SP GR UR STRIP: 1.01 (ref 1–1.03)
SQUAMOUS EPITHELIAL, UA: ABNORMAL
UROBILINOGEN UR STRIP-ACNC: 0.2
WBC #/AREA URNS HPF: ABNORMAL /HPF

## 2022-04-10 ENCOUNTER — HISTORICAL (OUTPATIENT)
Dept: ADMINISTRATIVE | Facility: HOSPITAL | Age: 77
End: 2022-04-10

## 2022-04-28 VITALS
WEIGHT: 145.5 LBS | SYSTOLIC BLOOD PRESSURE: 110 MMHG | BODY MASS INDEX: 27.47 KG/M2 | DIASTOLIC BLOOD PRESSURE: 70 MMHG | OXYGEN SATURATION: 97 % | HEIGHT: 61 IN

## 2022-05-04 NOTE — HISTORICAL [facility] CERNER
This is a historical note converted from Jacklyn. Formatting and pictures may have been removed.  Please reference Jacklyn for original formatting and attached multimedia. Chief Complaint  F/U B/L TKA 2/18/2019, X-RAY TODAY...CV  History of Present Illness  This patient is doing well overall well with her knees she has excellent range of motion she states her hip wounds are dry and her pain is greatly decreased.  Review of Systems  Review of Systems?  ?????Constitutional: ?No fever, No chills. ?  ?????Respiratory: ?No shortness of breath, No cough. ?  ?????Cardiovascular: ?No chest pain. ?  ?????Gastrointestinal: ?No nausea, No vomiting, No diarrhea, No constipation, No heartburn. ?  ?????Genitourinary: ?No dysuria, No hematuria. ?  ?????Hematology/Lymphatics: ?No bleeding tendency. ?  ?????Endocrine: ?No polyuria. ?  ?????Neurologic: ?Alert and oriented X4, No numbness, No tingling. ?  ?????Psychiatric: ?No anxiety, No depression. ?  ?????Integumentary: no abnormalities except as noted in history of present illness  Physical Exam  Vitals & Measurements  BP:?136/77?  HT:?154?cm? WT:?65.77?kg? BMI:?27.73?  PHYSICAL FINDINGS  Musculoskeletal System:  Knee:  General/bilateral: ? Pain was elicited by motion of the knee. ? Knees demonstrated muscle weakness. ? No swelling of the knee. ? No warmth of the knee. ? No instability of the knees.  Right Knee: ? Motion was abnormal.0-100  Left Knee: ? Motion was abnormal.0-110  Neurological:  Sensation: ? No sensory exam abnormalities were noted.  Motor: ? Strength was reduced.  Skin:  ? No cellulitis.  Wound healing normal.? Staples been removed.  Her x-rays show 2?total knee implants in both right and left knees had pretty well aligned.  Assessment/Plan  1.?Status post total bilateral knee replacement?Z96.793   Problem List/Past Medical History  Ongoing  Breast cancer screening  Colon polyps  Diverticulitis  Encounter for screening mammogram for breast  cancer  Fibromyalgia  Ganglion cyst of finger  Generalized headaches  Hiatal hernia  HLD (hyperlipidemia)  Hypothyroid  Migraine  Mild intermittent asthma  Osteoarthritis of left knee  Osteoarthritis of right knee  Peptic ulcer disease  Profuse sweating  Wart  Weakness  Well adult exam  Historical  Acid reflux  Arthritis  Asthma  Hypothyroidism  Numbness and tingling in hands  Osteopenia  Seasonal allergies  stenosis of spine  Procedure/Surgical History  Total Knee Arthroplasty Bilateral (Bilateral) (02/18/2019)  Fundoplication Nissen Robotic Assist (.) (12/10/2015)  Biopsy Gastrointestional (11/19/2015)  Bravo PH (11/19/2015)  Esophagogastroduodenoscopy (11/19/2015)  Esophageal Motility (05/21/2015)  Cholecystectomy (2013)  Back (2011)  Neck (2011)  Appendectomy (1978)  Hysterectomy (1978)  Tonsillectomy and adenoidectomy (1947)   Medications  AMITRIPTYLINE HCL 10 MG TAB, 20 mg= 2 tab(s), Oral, qPM  aspirin 81 mg oral Delayed Release (EC) tablet, 81 mg= 1 tab(s), Oral, BID,? ?Not taking: PT TAKES ONE DUE TO NOSE BLEEDS  citalopram 40 mg oral tablet, See Instructions, 3 refills  Colace 100 mg oral capsule, 200 mg= 2 cap(s), Oral, Daily  fluticasone-salmeterol 250 mcg-50 mcg inhalation powder, 1 puff(s), INH, BID, 11 refills,? ?Still taking, not as prescribed: prn only  levothyroxine 50 mcg (0.05 mg) oral tablet, 50 mcg= 1 tab(s), Oral, Daily, 3 refills  Lipitor 10 mg oral tablet, 10 mg= 1 tab(s), Oral, Daily, 3 refills  multivitamin with minerals (Adult Tab), 1 tab, Oral, Daily  Percocet 5/325 oral tablet, 1 tab(s), Oral, q6hr, PRN  ProAir HFA 90 mcg/inh inhalation aerosol with adapter, 2 puff(s), INH, QID, PRN, 11 refills,? ?Still taking, not as prescribed: Last Dose Date/Time Unknown prn  RANITIDINE 150 MG TABLET, 150 mg= 1 tab(s), Oral, BID,? ?Still taking, not as prescribed: Takes one tab Bid  Singulair 10 mg oral TABLET, 10 mg= 1 tab(s), Oral, qPM, 11 refills  Spiriva 18 mcg inhalation capsule, 18 mcg= 1  cap(s), INH, Daily, 11 refills,? ?Still taking, not as prescribed: daily  tiZANidine 4 mg oral tablet, See Instructions, 8 refills,? ?Not taking: OFF FOR SURGERY  Vitamin D, 1000 iu, Oral, Daily  Allergies  Dairy foods  Darvon  Mildew  Milk Products  Mold  Social History  Alcohol - Denies Alcohol Use, 02/01/2012  Never, 03/30/2015  Employment/School  Retired, 03/30/2015  Exercise  Exercise frequency: 1-2 times/week., 03/30/2015  Home/Environment  Risks in environment: Pets/Animal exposure., 01/30/2017  Lives with Spouse., 03/30/2015  Nutrition/Health  Regular, 03/30/2015  Sexual  Sexually active: Yes., 05/26/2016  Substance Abuse - Denies Substance Abuse, 02/01/2012  Never, 03/30/2015  Tobacco - Denies Tobacco Use, 02/01/2012  Never (less than 100 in lifetime), N/A, 03/07/2019  Never (less than 100 in lifetime), N/A, 02/18/2019  Never (less than 100 in lifetime), N/A, 01/15/2019  Never smoker, No, 12/10/2018  Family History  Breast cancer: Mother and Sister.  Heart disease: Mother.  Primary malignant neoplasm of breast: Mother.  Immunizations  Vaccine Date Status   zoster vaccine live 09/04/2018 Recorded   influenza virus vaccine, inactivated 09/04/2018 Recorded   zoster vaccine, inactivated 05/24/2018 Recorded   influenza virus vaccine, inactivated 09/26/2017 Recorded   influenza virus vaccine, inactivated 2016 Recorded   influenza virus vaccine, inactivated 10/15/2015 Recorded   tetanus/diphtheria/pertussis, acel(Tdap) 10/14/2015 Recorded   pneumococcal 13-valent conjugate vaccine 10/14/2015 Recorded   influenza virus vaccine, inactivated 10/08/2015 Recorded   influenza virus vaccine, inactivated 09/30/2013 Recorded   pneumococcal 23-polyvalent vaccine 08/01/2013 Recorded   Health Maintenance  Health Maintenance  ???Pending?(in the next year)  ??? ??OverDue  ??? ? ? ?Pneumococcal Vaccine due??and every?  ??? ? ? ?Asthma Management-Asthma Education due??12/05/18??and every 6??month(s)  ??? ? ? ?Asthma  Management-Written Action Plan due??12/05/18??and every 6??month(s)  ??? ??Due?  ??? ? ? ?ADL Screening due??03/07/19??and every 1??year(s)  ??? ? ? ?Asthma Management-[place] due??03/07/19??Variable frequency  ??? ? ? ?Cognitive Screening due??03/07/19??and every 1??year(s)  ??? ? ? ?Colorectal Screening (Senior Wellness) due??03/07/19??and every?  ??? ? ? ?Functional Assessment due??03/07/19??and every 1??year(s)  ??? ? ? ?Geriatric Depression Screening due??03/07/19??and every 1??year(s)  ??? ??Due In Future?  ??? ? ? ?Breast Cancer Screening (Senior Wellness) not due until??06/14/19??and every 2??year(s)  ??? ? ? ?Advance Directive not due until??06/28/19??and every 1??year(s)  ??? ? ? ?Fall Risk Assessment not due until??02/20/20??and every 1??year(s)  ??? ? ? ?Asthma Management-Spirometry not due until??02/20/20??and every 1??year(s)  ???Satisfied?(in the past 1 year)  ??? ??Satisfied?  ??? ? ? ?Advance Directive on??06/28/18.??Satisfied by Beth Morgan LPN  ??? ? ? ?Aspirin Therapy for CVD Prevention on??02/20/19.??Satisfied by Sarah Chappell RN  ??? ? ? ?Asthma Management-Spirometry on??02/20/19.??Satisfied by Sarah Chappell RN  ??? ? ? ?Blood Pressure Screening on??03/07/19.??Satisfied by Rona Amin LPN  ??? ? ? ?Body Mass Index Check on??03/07/19.??Satisfied by Rona Amin LPN  ??? ? ? ?Bone Density Screening on??09/07/18.  ??? ? ? ?Depression Screening on??12/10/18.??Satisfied by Beth Morgan LPN  ??? ? ? ?Diabetes Screening on??02/20/19.??Satisfied by Evangelista Smith  ??? ? ? ?Fall Risk Assessment on??02/20/19.??Satisfied by Sarah Chappell RN  ??? ? ? ?Influenza Vaccine on??09/04/18.??Satisfied by Beth Morgan LPN  ??? ? ? ?Lipid Screening on??12/04/18.??Satisfied by France Bello  ??? ? ? ?Obesity Screening on??03/07/19.??Satisfied by Rona Amin LPN  ??? ? ? ?Zoster Vaccine on??09/04/18.??Satisfied by Beth Morgan LPN  H.  ?  ?

## 2022-05-04 NOTE — HISTORICAL [facility] C[facility]
This is a historical note converted from Jacklyn. Formatting and pictures may have been removed.  Please reference Jacklyn for original formatting and attached multimedia. Chief Complaint  PT HERE FOR B/L KNEE PAIN AND RT RING FINGER CYST... X-RAY TODAY. STATES SHE HAD STERIOD INJECTIONS IN THE PAST IN HER KNEES...CV  History of Present Illness  Knee symptoms ::knee gives way ??? Knee joint pain ??? Worse with weightbearing ??? Worse in the morning  ??? Slowly worsens with extended activity ??? Is increased by bending it ??? By twisting it ??? By kneeling ??? With stairs ??? By squatting  ??? Knee joint swelling ?  ?? Knee joint pain not improved by rest??  ??? The knee did not suddenly buckle due to contact ?? No popping sound was heard in the knee?  ??? No tingling ??? No burning sensation  ?  right knee pain is more than the left  Review of Systems  Review of Systems?  ?????Constitutional: ?No fever, No chills. ?  ?????Respiratory: ?No shortness of breath, No cough. ?  ?????Cardiovascular: ?No chest pain. ?  ?????Gastrointestinal: ?No nausea, No vomiting, No diarrhea, No constipation, No heartburn. ?  ?????Genitourinary: ?No dysuria, No hematuria. ?  ?????Hematology/Lymphatics: ?No bleeding tendency. ?  ?????Endocrine: ?No polyuria. ?  ?????Neurologic: ?Alert and oriented X4, No numbness, No tingling. ?  ?????Psychiatric: ?No anxiety, No depression. ?  ?????Integumentary: no abnormalities except as noted in history of present illness  Physical Exam  Vitals & Measurements  HR:?85(Peripheral)? BP:?119/67?  HT:?154?cm? HT:?154?cm? WT:?66.67?kg? WT:?66.67?kg? BMI:?28.11?  PHYSICAL FINDINGS  Cardiovascular:  Arterial Pulses: ? Dorsalis pedis pulses were normal right. ? Dorsalis pedis pulses were normal right.  Musculoskeletal System:  Hips:  General/bilateral: ? No swelling of the hips. ? No induration of the hips.  Right Hip: ? Motion was normal. ? No pain was elicited by active internal rotation with the hip  flexed.  ? No pain was elicited by active external rotation with the hip flexed. ? No pain was elicited by active motion. ? No pain was elicited by passive motion.  Left Hip: ? Motion was normal.  Right Knee: ? Examined. ? Positive effusion. ?Localized swelling. ?Genu varum. ?Patella demonstrated crepitus. ?Anteromedial aspect was tender on palpation. ?Medial aspect was tender on palpation.  patella  ?   Right Knee:  Knee Motion: Value Normal Range  Active flexion?120 degrees  Active extension? 10?degrees  ???  ? Pain was elicited throughout the range of motion. ? Swelling with a negative fluctuation test. ? No erythema. ? No warmth. ? Anterior aspect was not tender on palpation. ? Patellofemoral region was tender on palpation. ? Medial collateral ligament was not tender on palpation. ? Lateral collateral ligament was not tender on palpation. ? No pain was elicited by motion using [place] apprehension test. ? No laxity of the posterior cruciate ligament. ? No anterior drawer sign was present. ? No one plane medial (straight) instability. ? No one plane lateral (straight) instability. ? A Lachman test did not demonstrate one plane anterior instability. ? Clarkes sign was observed for chondromalacia.  ?   Left Knee:  Knee Motion: Value Normal Range  Active flexion? 120?degrees  Active extension? 10 degrees  ???  ???  TESTS  Imaging:  X-Ray Knee:  AP and lateral view x-rays of the right and left?knee with sunrise view of the patella were performed .  ???  IMPRESSIONS RADIOLOGY TEST  Narrowing of the knee joint space bone on bone, showed sclerosis of the knee, and osteophytes  varus deformity  ?  ?   patient would like to have?bilateral TKA  ?? Discussion of orthopedic surgery limitations and risks, benefits, alternatives, and complications of operative and nonoperative treatments were discussed with patient and family. They understand, agree and want to proceed with operation/procedure  ??Discussion of orthopedic  surgery limitations and risks: recurrence of problem, pain, deformity: problems with prosthesis, prosthesis dislocation, prosthesis loosening, prosthesis failure, problems with implanted hardware, loosening of implanted hardware, failure of implanted hardware, reaction of soft tissue to implanted hardware, protrusion of implanted hardware, pain from implanted hardware, stiffness, nerve injury, vascular injury, skin necrosis, tendon adhesion.  ?  ?   right hand 4th finger cyst  no numbness of?the fingers  normal ROM  ?   x-rays of the 4th finger  arthritic changes of the PIP and DIP joints but no acute lisa abnormalities  Assessment/Plan  1.?Osteoarthritis of right knee?M17.11  ?  2.?Osteoarthritis of left knee?M17.12  ?  3.?Ganglion cyst of finger?M67.449  ?  Left knee pain?M25.562  Ordered:  XR Knee Left 4 or More Views, Routine, 01/15/19 13:50:00 CST, Pain, None, Ambulatory, Rad Type, Left knee pain, 01/15/19 13:50:00 CST  ?  Pain in finger of right hand?M79.644  Ordered:  XR Hand Fourth Digit Right Min 2 Views, Routine, 01/15/19 13:50:00 CST, Pain, None, Ambulatory, Rad Type, Pain in finger of right hand, 01/15/19 13:50:00 CST  ?  Right knee pain?M25.561  Ordered:  XR Knee Right 4 or More Views, Routine, 01/15/19 13:50:00 CST, Pain, None, Ambulatory, Rad Type, Right knee pain, 01/15/19 13:50:00 CST  ?   Problem List/Past Medical History  Ongoing  Breast cancer screening  Colon polyps  Diverticulitis  Encounter for screening mammogram for breast cancer  Fibromyalgia  Ganglion cyst of finger  Generalized headaches  Hiatal hernia  HLD (hyperlipidemia)  Hypothyroid  Migraine  Mild intermittent asthma  Osteoarthritis of left knee  Osteoarthritis of right knee  Peptic ulcer disease  Profuse sweating  Wart  Weakness  Well adult exam  Historical  Acid reflux  Arthritis  Asthma  Hypothyroidism  Numbness and tingling in hands  Osteopenia  Seasonal allergies  stenosis of spine  Procedure/Surgical History  Fundoplication  Nissen Robotic Assist (.) (12/10/2015)  Biopsy Gastrointestional (11/19/2015)  Bravo PH (11/19/2015)  Esophagogastroduodenoscopy (11/19/2015)  Esophageal Motility (05/21/2015)  Cholecystectomy (2013)  Back (2011)  Neck (2011)  Appendectomy (1978)  Hysterectomy (1978)  Tonsillectomy and adenoidectomy (1947)   Medications  AMITRIPTYLINE HCL 10 MG TAB, 10 mg= 1 tab(s), Oral, qPM  citalopram 40 mg oral tablet, See Instructions, 3 refills  fluticasone-salmeterol 250 mcg-50 mcg inhalation powder, 1 puff(s), INH, BID, 11 refills  hyoscyamine 0.125 mg oral tablet, 0.125 mg= 1 tab(s), Oral, TID, PRN  IMIQUIMOD 5% CREAM SINGLE USE PKTS  levothyroxine 50 mcg (0.05 mg) oral tablet, 50 mcg= 1 tab(s), Oral, Daily, 3 refills  Lipitor 10 mg oral tablet, 10 mg= 1 tab(s), Oral, Daily, 3 refills  multivitamin with minerals (Adult Tab), 1 tab, Oral, Daily  ProAir HFA 90 mcg/inh inhalation aerosol with adapter, 2 puff(s), INH, QID, PRN, 11 refills  RANITIDINE 150 MG TABLET, 150 mg= 1 tab(s), Oral, Once a day (at bedtime)  RANITIDINE 300 MG TABLET, 300 mg= 1 tab(s), Oral, Daily,? ?Not Taking per Prescriber: TAKING 150MG  Singulair 10 mg oral TABLET, 10 mg= 1 tab(s), Oral, qPM, 11 refills  Spiriva 18 mcg inhalation capsule, 18 mcg= 1 cap(s), INH, Daily, 11 refills  tiZANidine 4 mg oral tablet, See Instructions, 8 refills  VESICARE 10 MG TABLET, 10 mg= 1 tab(s), Oral, Daily  Vitamin D, 1000 iu, Oral, Daily  Allergies  Dairy foods  Darvon  Mildew  Milk Products  Mold  Social History  Alcohol - Denies Alcohol Use, 02/01/2012  Never, 03/30/2015  Employment/School  Retired, 03/30/2015  Exercise  Exercise frequency: 1-2 times/week., 03/30/2015  Home/Environment  Risks in environment: Pets/Animal exposure., 01/30/2017  Lives with Spouse., 03/30/2015  Nutrition/Health  Regular, 03/30/2015  Sexual  Sexually active: Yes., 05/26/2016  Substance Abuse - Denies Substance Abuse, 02/01/2012  Never, 03/30/2015  Tobacco - Denies Tobacco Use,  02/01/2012  Never (less than 100 in lifetime), N/A, 01/15/2019  Never smoker, No, 12/10/2018  Family History  Breast cancer: Mother and Sister.  Heart disease: Mother.  Primary malignant neoplasm of breast: Mother.  Immunizations  Vaccine Date Status   zoster vaccine live 09/04/2018 Recorded   influenza virus vaccine, inactivated 09/04/2018 Recorded   zoster vaccine, inactivated 05/24/2018 Recorded   influenza virus vaccine, inactivated 09/26/2017 Recorded   influenza virus vaccine, inactivated 2016 Recorded   influenza virus vaccine, inactivated 10/15/2015 Recorded   tetanus/diphtheria/pertussis, acel(Tdap) 10/14/2015 Recorded   pneumococcal 13-valent conjugate vaccine 10/14/2015 Recorded   influenza virus vaccine, inactivated 10/08/2015 Recorded   influenza virus vaccine, inactivated 09/30/2013 Recorded   pneumococcal 23-polyvalent vaccine 08/01/2013 Recorded   Health Maintenance  Health Maintenance  ???Pending?(in the next year)  ??? ??OverDue  ??? ? ? ?Pneumococcal Vaccine due??and every?  ??? ? ? ?Asthma Management-Spirometry due??12/10/16??and every 1??year(s)  ??? ? ? ?Asthma Management-Asthma Education due??12/05/18??and every 6??month(s)  ??? ? ? ?Asthma Management-Written Action Plan due??12/05/18??and every 6??month(s)  ??? ??Due?  ??? ? ? ?ADL Screening due??01/15/19??and every 1??year(s)  ??? ? ? ?Asthma Management-[place] due??01/15/19??Variable frequency  ??? ? ? ?Cognitive Screening due??01/15/19??and every 1??year(s)  ??? ? ? ?Colorectal Screening (Senior Wellness) due??01/15/19??and every?  ??? ? ? ?Functional Assessment due??01/15/19??and every 1??year(s)  ??? ? ? ?Geriatric Depression Screening due??01/15/19??and every 1??year(s)  ??? ??Due In Future?  ??? ? ? ?Breast Cancer Screening (Senior Wellness) not due until??06/14/19??and every 2??year(s)  ??? ? ? ?Advance Directive not due until??06/28/19??and every 1??year(s)  ??? ? ? ?Fall Risk Assessment not due until??12/10/19??and every  1??year(s)  ???Satisfied?(in the past 1 year)  ??? ??Satisfied?  ??? ? ? ?Advance Directive on??06/28/18.??Satisfied by Beth Morgan LPN.  ??? ? ? ?Asthma Management-Written Action Plan on??06/06/18.??Satisfied by Beth Morgan LPN.??Reason: Expectation Satisfied Elsewhere  ??? ? ? ?Blood Pressure Screening on??01/15/19.??Satisfied by Rona Amin LPN  ??? ? ? ?Body Mass Index Check on??01/15/19.??Satisfied by Rona Amin LPN  ??? ? ? ?Bone Density Screening on??09/07/18.  ??? ? ? ?Depression Screening on??12/10/18.??Satisfied by Beth Morgan LPN.  ??? ? ? ?Diabetes Screening on??12/04/18.??Satisfied by France Bello  ??? ? ? ?Fall Risk Assessment on??12/10/18.??Satisfied by Beth Morgan LPN.  ??? ? ? ?Influenza Vaccine on??09/04/18.??Satisfied by Beth Morgan LPN  ??? ? ? ?Lipid Screening on??12/04/18.??Satisfied by France Bello  ??? ? ? ?Obesity Screening on??01/15/19.??Satisfied by Rona Amin LPN  ??? ? ? ?Zoster Vaccine on??09/04/18.??Satisfied by Beth Morgan LPN  ?  ?

## 2022-07-06 ENCOUNTER — TELEPHONE (OUTPATIENT)
Dept: INTERNAL MEDICINE | Facility: CLINIC | Age: 77
End: 2022-07-06
Payer: MEDICARE

## 2022-07-06 RX ORDER — LEVOTHYROXINE SODIUM 50 UG/1
50 TABLET ORAL DAILY
COMMUNITY
Start: 2021-06-29 | End: 2022-07-06 | Stop reason: SDUPTHER

## 2022-07-06 RX ORDER — LEVOTHYROXINE SODIUM 50 UG/1
50 TABLET ORAL
Qty: 90 TABLET | Refills: 0 | Status: SHIPPED | OUTPATIENT
Start: 2022-07-06 | End: 2022-10-17

## 2022-07-06 NOTE — TELEPHONE ENCOUNTER
rx sent    ----- Message from Sonia Wolfe sent at 7/6/2022  1:50 PM CDT -----  Regarding: Med Refill  Good afternoon,     Patient called asking for refill on levothyroxine.  [facility] [place]

## 2022-08-17 ENCOUNTER — PATIENT MESSAGE (OUTPATIENT)
Dept: ADMINISTRATIVE | Facility: OTHER | Age: 77
End: 2022-08-17
Payer: MEDICARE

## 2022-08-18 ENCOUNTER — PATIENT MESSAGE (OUTPATIENT)
Dept: ADMINISTRATIVE | Facility: OTHER | Age: 77
End: 2022-08-18
Payer: MEDICARE

## 2022-08-22 DIAGNOSIS — E03.9 HYPOTHYROIDISM, UNSPECIFIED TYPE: ICD-10-CM

## 2022-08-22 DIAGNOSIS — M81.0 OSTEOPOROSIS, UNSPECIFIED OSTEOPOROSIS TYPE, UNSPECIFIED PATHOLOGICAL FRACTURE PRESENCE: ICD-10-CM

## 2022-08-22 DIAGNOSIS — Z00.00 MEDICARE ANNUAL WELLNESS VISIT, SUBSEQUENT: Primary | ICD-10-CM

## 2022-08-22 DIAGNOSIS — E78.5 HYPERLIPIDEMIA, UNSPECIFIED HYPERLIPIDEMIA TYPE: ICD-10-CM

## 2022-08-22 RX ORDER — MONTELUKAST SODIUM 10 MG/1
TABLET ORAL
COMMUNITY
Start: 2022-04-14 | End: 2022-12-20 | Stop reason: SDUPTHER

## 2022-08-22 RX ORDER — ALBUTEROL SULFATE 90 UG/1
1 AEROSOL, METERED RESPIRATORY (INHALATION) EVERY 4 HOURS PRN
COMMUNITY
Start: 2021-10-12

## 2022-08-30 ENCOUNTER — LAB VISIT (OUTPATIENT)
Dept: LAB | Facility: HOSPITAL | Age: 77
End: 2022-08-30
Attending: FAMILY MEDICINE
Payer: MEDICARE

## 2022-08-30 DIAGNOSIS — Z00.00 MEDICARE ANNUAL WELLNESS VISIT, SUBSEQUENT: ICD-10-CM

## 2022-08-30 DIAGNOSIS — M81.0 OSTEOPOROSIS, UNSPECIFIED OSTEOPOROSIS TYPE, UNSPECIFIED PATHOLOGICAL FRACTURE PRESENCE: ICD-10-CM

## 2022-08-30 DIAGNOSIS — E78.5 HYPERLIPIDEMIA, UNSPECIFIED HYPERLIPIDEMIA TYPE: ICD-10-CM

## 2022-08-30 DIAGNOSIS — E03.9 HYPOTHYROIDISM, UNSPECIFIED TYPE: ICD-10-CM

## 2022-08-30 LAB
ALBUMIN SERPL-MCNC: 4.1 GM/DL (ref 3.4–4.8)
ALBUMIN/GLOB SERPL: 1.2 RATIO (ref 1.1–2)
ALP SERPL-CCNC: 90 UNIT/L (ref 40–150)
ALT SERPL-CCNC: 23 UNIT/L (ref 0–55)
AST SERPL-CCNC: 23 UNIT/L (ref 5–34)
BASOPHILS # BLD AUTO: 0.04 X10(3)/MCL (ref 0–0.2)
BASOPHILS NFR BLD AUTO: 0.5 %
BILIRUBIN DIRECT+TOT PNL SERPL-MCNC: 0.8 MG/DL
BUN SERPL-MCNC: 11 MG/DL (ref 9.8–20.1)
CALCIUM SERPL-MCNC: 10.2 MG/DL (ref 8.4–10.2)
CHLORIDE SERPL-SCNC: 109 MMOL/L (ref 98–107)
CHOLEST SERPL-MCNC: 161 MG/DL
CHOLEST/HDLC SERPL: 3 {RATIO} (ref 0–5)
CO2 SERPL-SCNC: 28 MMOL/L (ref 23–31)
CREAT SERPL-MCNC: 0.85 MG/DL (ref 0.55–1.02)
DEPRECATED CALCIDIOL+CALCIFEROL SERPL-MC: 85.4 NG/ML (ref 30–80)
EOSINOPHIL # BLD AUTO: 0.14 X10(3)/MCL (ref 0–0.9)
EOSINOPHIL NFR BLD AUTO: 1.9 %
ERYTHROCYTE [DISTWIDTH] IN BLOOD BY AUTOMATED COUNT: 11.9 % (ref 11.5–17)
GFR SERPLBLD CREATININE-BSD FMLA CKD-EPI: >60 MLS/MIN/1.73/M2
GLOBULIN SER-MCNC: 3.3 GM/DL (ref 2.4–3.5)
GLUCOSE SERPL-MCNC: 108 MG/DL (ref 82–115)
HCT VFR BLD AUTO: 44.8 % (ref 37–47)
HDLC SERPL-MCNC: 48 MG/DL (ref 35–60)
HGB BLD-MCNC: 15 GM/DL (ref 12–16)
IMM GRANULOCYTES # BLD AUTO: 0.01 X10(3)/MCL (ref 0–0.04)
IMM GRANULOCYTES NFR BLD AUTO: 0.1 %
LDLC SERPL CALC-MCNC: 86 MG/DL (ref 50–140)
LYMPHOCYTES # BLD AUTO: 2.52 X10(3)/MCL (ref 0.6–4.6)
LYMPHOCYTES NFR BLD AUTO: 33.8 %
MCH RBC QN AUTO: 31.7 PG (ref 27–31)
MCHC RBC AUTO-ENTMCNC: 33.5 MG/DL (ref 33–36)
MCV RBC AUTO: 94.7 FL (ref 80–94)
MONOCYTES # BLD AUTO: 0.45 X10(3)/MCL (ref 0.1–1.3)
MONOCYTES NFR BLD AUTO: 6 %
NEUTROPHILS # BLD AUTO: 4.3 X10(3)/MCL (ref 2.1–9.2)
NEUTROPHILS NFR BLD AUTO: 57.7 %
NRBC BLD AUTO-RTO: 0 %
PLATELET # BLD AUTO: 263 X10(3)/MCL (ref 130–400)
PMV BLD AUTO: 10.6 FL (ref 7.4–10.4)
POTASSIUM SERPL-SCNC: 4.8 MMOL/L (ref 3.5–5.1)
PROT SERPL-MCNC: 7.4 GM/DL (ref 5.8–7.6)
RBC # BLD AUTO: 4.73 X10(6)/MCL (ref 4.2–5.4)
SODIUM SERPL-SCNC: 143 MMOL/L (ref 136–145)
TRIGL SERPL-MCNC: 137 MG/DL (ref 37–140)
TSH SERPL-ACNC: 2.23 UIU/ML (ref 0.35–4.94)
VLDLC SERPL CALC-MCNC: 27 MG/DL
WBC # SPEC AUTO: 7.5 X10(3)/MCL (ref 4.5–11.5)

## 2022-08-30 PROCEDURE — 84443 ASSAY THYROID STIM HORMONE: CPT

## 2022-08-30 PROCEDURE — 80061 LIPID PANEL: CPT

## 2022-08-30 PROCEDURE — 82306 VITAMIN D 25 HYDROXY: CPT

## 2022-08-30 PROCEDURE — 36415 COLL VENOUS BLD VENIPUNCTURE: CPT

## 2022-08-30 PROCEDURE — 85025 COMPLETE CBC W/AUTO DIFF WBC: CPT

## 2022-08-30 PROCEDURE — 80053 COMPREHEN METABOLIC PANEL: CPT

## 2022-09-07 ENCOUNTER — OFFICE VISIT (OUTPATIENT)
Dept: FAMILY MEDICINE | Facility: CLINIC | Age: 77
End: 2022-09-07
Payer: MEDICARE

## 2022-09-07 VITALS
RESPIRATION RATE: 16 BRPM | TEMPERATURE: 97 F | HEIGHT: 61 IN | DIASTOLIC BLOOD PRESSURE: 73 MMHG | SYSTOLIC BLOOD PRESSURE: 123 MMHG | WEIGHT: 138.63 LBS | OXYGEN SATURATION: 98 % | BODY MASS INDEX: 26.17 KG/M2 | HEART RATE: 78 BPM

## 2022-09-07 DIAGNOSIS — Z00.00 ROUTINE GENERAL MEDICAL EXAMINATION AT A HEALTH CARE FACILITY: Primary | ICD-10-CM

## 2022-09-07 DIAGNOSIS — K51.419: ICD-10-CM

## 2022-09-07 DIAGNOSIS — F41.1 GENERALIZED ANXIETY DISORDER: ICD-10-CM

## 2022-09-07 DIAGNOSIS — F33.9 EPISODE OF RECURRENT MAJOR DEPRESSIVE DISORDER, UNSPECIFIED DEPRESSION EPISODE SEVERITY: ICD-10-CM

## 2022-09-07 PROBLEM — M67.449 GANGLION OF HAND: Status: ACTIVE | Noted: 2022-09-07

## 2022-09-07 PROBLEM — M17.11 OSTEOARTHRITIS OF RIGHT KNEE: Status: ACTIVE | Noted: 2022-09-07

## 2022-09-07 PROBLEM — G47.00 INSOMNIA: Status: ACTIVE | Noted: 2022-09-07

## 2022-09-07 PROBLEM — K27.9 PEPTIC ULCER DISEASE: Status: ACTIVE | Noted: 2022-09-07

## 2022-09-07 PROBLEM — R53.1 WEAKNESS: Status: ACTIVE | Noted: 2022-09-07

## 2022-09-07 PROBLEM — K21.9 GASTROESOPHAGEAL REFLUX DISEASE: Status: ACTIVE | Noted: 2022-09-07

## 2022-09-07 PROBLEM — M17.12 OSTEOARTHRITIS OF LEFT KNEE: Status: ACTIVE | Noted: 2022-09-07

## 2022-09-07 PROBLEM — M85.80 OSTEOPENIA: Status: ACTIVE | Noted: 2022-09-07

## 2022-09-07 PROBLEM — M81.0 OSTEOPOROSIS: Status: ACTIVE | Noted: 2022-09-07

## 2022-09-07 PROBLEM — G43.909 MIGRAINE HEADACHE: Status: ACTIVE | Noted: 2022-09-07

## 2022-09-07 PROBLEM — K51.40 PSEUDOPOLYPOSIS OF COLON: Status: ACTIVE | Noted: 2022-09-07

## 2022-09-07 PROBLEM — K58.9 IRRITABLE BOWEL SYNDROME: Status: ACTIVE | Noted: 2022-09-07

## 2022-09-07 PROBLEM — K44.9 HIATAL HERNIA: Status: ACTIVE | Noted: 2022-09-07

## 2022-09-07 PROBLEM — J45.20 MILD INTERMITTENT ASTHMA: Status: ACTIVE | Noted: 2022-09-07

## 2022-09-07 PROBLEM — B07.9 VERRUCA: Status: ACTIVE | Noted: 2022-09-07

## 2022-09-07 PROBLEM — E03.9 HYPOTHYROIDISM: Status: ACTIVE | Noted: 2022-09-07

## 2022-09-07 PROBLEM — R61 EXCESSIVE SWEATING: Status: ACTIVE | Noted: 2022-09-07

## 2022-09-07 PROBLEM — K57.92 DIVERTICULITIS: Status: ACTIVE | Noted: 2022-09-07

## 2022-09-07 PROBLEM — E78.5 HYPERLIPIDEMIA: Status: ACTIVE | Noted: 2022-09-07

## 2022-09-07 PROBLEM — M79.7 FIBROMYALGIA: Status: ACTIVE | Noted: 2022-09-07

## 2022-09-07 PROBLEM — M19.90 ARTHRITIS: Status: ACTIVE | Noted: 2022-09-07

## 2022-09-07 PROBLEM — R91.8 MULTIPLE PULMONARY NODULES: Status: ACTIVE | Noted: 2022-09-07

## 2022-09-07 PROBLEM — Z12.39 SCREENING FOR BREAST CANCER: Status: ACTIVE | Noted: 2022-09-07

## 2022-09-07 PROCEDURE — G0439 PR MEDICARE ANNUAL WELLNESS SUBSEQUENT VISIT: ICD-10-PCS | Mod: ,,, | Performed by: FAMILY MEDICINE

## 2022-09-07 PROCEDURE — G0439 PPPS, SUBSEQ VISIT: HCPCS | Mod: ,,, | Performed by: FAMILY MEDICINE

## 2022-09-07 RX ORDER — DULOXETIN HYDROCHLORIDE 30 MG/1
30 CAPSULE, DELAYED RELEASE ORAL DAILY
Qty: 30 CAPSULE | Refills: 11 | Status: SHIPPED | OUTPATIENT
Start: 2022-09-07 | End: 2022-10-17

## 2022-09-07 NOTE — PROGRESS NOTES
Patient ID: [id number]     Chief Complaint: Annual Exam        HPI:     Olivia Lovell is a 77 y.o. female here today for an annual wellness visit. Reviewed and discussed lab results.  Overall she feels well but is experiencing some anxiety associated with husbands dementia and behavioral changes.       ----------------------------  Anxiety disorder, unspecified  Asthenia  Asthma  Cataract  Diverticulitis  Family history of breast cancer in first degree relative      Comment:  mother and sister  Fibromyalgia  GERD (gastroesophageal reflux disease)  Hiatal hernia  Hyperlipidemia  Hypothyroidism, unspecified  Insomnia  Major depression, recurrent  Migraines  Multiple pulmonary nodules  Osteoarthritis of both knees  Osteoporosis  PUD (peptic ulcer disease)  Sweating profusely  Thyroid disease  Verruca vulgaris     Past Surgical History:   Procedure Laterality Date    APPENDECTOMY  1952    BACK SURGERY  2011    CHOLECYSTECTOMY  2014    COLONOSCOPY  2016    Dr Charly Forde    ESOPHAGOGASTRODUODENOSCOPY  05/14/2018    Dr Charly Forde    ESOPHAGOGASTRODUODENOSCOPY  11/19/2015    Dr Charly Forde    EYE SURGERY  08/19/2022    Cataract    NECK SURGERY  2010    ROBOT-ASSISTED NISSEN FUNDOPLICATION  12/10/2015    Dr Stewart Zhang    TONSILLECTOMY  1947    TOTAL ABDOMINAL HYSTERECTOMY  1975    TOTAL KNEE ARTHROPLASTY Bilateral 02/18/2019    Dr Mayco North       Review of patient's allergies indicates:   Allergen Reactions    Dairy-aid     Milk containing products     Mold     Propoxyphene     Pyrethrins-piperonyl butoxide        Outpatient Medications Marked as Taking for the 9/7/22 encounter (Office Visit) with Omar Meneses, DO   Medication Sig Dispense Refill    albuterol (PROVENTIL/VENTOLIN HFA) 90 mcg/actuation inhaler Inhale into the lungs.      atorvastatin (LIPITOR) 10 MG tablet Take 10 mg by mouth once daily.      levothyroxine (SYNTHROID) 50 MCG tablet Take 1 tablet (50 mcg total) by mouth before breakfast. Take  30min prior to other meds, food, drink 90 tablet 0    meclizine (ANTIVERT) 25 mg tablet Take by mouth as needed.      montelukast (SINGULAIR) 10 mg tablet SMARTSI Tablet(s) By Mouth Every Evening      omeprazole (PRILOSEC) 40 MG capsule Take by mouth once daily.      tiZANidine (ZANAFLEX) 4 MG tablet Take 4 mg by mouth nightly.      traZODone (DESYREL) 50 MG tablet   See Instructions, TAKE 1 TO 3 TABLETS BY MOUTH EVERY DAY AT BEDTIME, # 90 tab(s), 2 Refill(s), Pharmacy: [id number], 154, cm, Height/Length Dosing, 21 7:56:00 CDT, 66, kg, Weight Dosing, 21 7:56:00 CDT      triamcinolone (NASACORT) 55 mcg nasal inhaler by Nasal route.         Social History     Socioeconomic History    Marital status:    Tobacco Use    Smoking status: Never    Smokeless tobacco: Never   Substance and Sexual Activity    Alcohol use: Never    Drug use: Never    Sexual activity: Not Currently     Birth control/protection: See Surgical Hx        Family History   Problem Relation Age of Onset    Breast cancer Mother         Cause of death    Diabetes Mellitus Mother     Coronary artery disease Mother     Hypertension Mother     Thyroid disease Mother     Arthritis Mother     Heart disease Mother     Coronary artery disease Father         Cause of death    Breast cancer Sister     Cancer Sister     Vision loss Maternal Grandfather         Patient Care Team:  Omar Meneses DO as PCP - General (Family Medicine)  Aditya Anton Jr., MD, [facility] as Consulting Physician (Pulmonary Disease)  Thomas Vasquez MD as Consulting Physician (Cardiology)  Stewart Sinclair MD as Consulting Physician (Urology)  Jet Sheppard MD as Consulting Physician (Gastroenterology)       Subjective:     ROS    See HPI for details    Constitutional: Denies Change in appetite. Denies Chills. Denies Fever. Denies Night sweats.  Eye: Denies Blurred vision. Denies Discharge. Denies Eye pain.  ENT: Denies Decreased hearing. Denies Sore  "throat. Denies Swollen glands.  Respiratory: Denies Cough. Denies Shortness of breath. Denies Shortness of breath with exertion. Denies Wheezing.  Cardiovascular: DeniesChest pain at rest. Denies Chest pain with exertion. Denies Irregular heartbeat. Denies Palpitations. Denies Edema.  Gastrointestinal: Denies Abdominal pain. DeniesDiarrhea. Denies Nausea. Denies Vomiting. Denies Hematemesis or Hematochezia.  Genitourinary: Denies Dysuria. Denies Urinary frequency. Denies Urinary urgency. Denies Blood in urine.  Endocrine: Denies Cold intolerance. Denies Excessive thirst. Denies Heat intolerance. Denies Weight loss. Denies Weight gain.  Musculoskeletal: Denies Painful joints. Denies Weakness.  Integumentary: Denies Rash. Denies Itching. Denies Dry skin.  Neurologic: Denies Dizziness. Denies Fainting. Denies Headache.  Psychiatric: Denies Depression. Admits  Anxiety. Denies Suicidal/Homicidal ideations.    All Other ROS: Negative except as stated in HPI.       Objective:     /73   Pulse 78   Temp 97 °F (36.1 °C) (Tympanic)   Resp 16   Ht 5' 0.63" (1.54 m)   Wt 62.9 kg (138 lb 9.6 oz)   SpO2 98%   BMI 26.51 kg/m²     Physical Exam    General: Alert and oriented, No acute distress.  Respiratory: Clear to auscultation bilaterally; No wheezes, rales or rhonchi, Non-labored respirations, Symmetrical chest wall expansion.  Cardiovascular: Regular rate and rhythm, S1/S2 normal, No murmurs, rubs or gallops.  Gastrointestinal: Soft, Non-tender, Non-distended, Normal bowel sounds, No palpable organomegaly.  Musculoskeletal: Normal range of motion.  Integumentary: Warm, Dry, Intact, No suspicious lesions or rashes.  Extremities: No cyanosis or edema  Neurologic: No focal deficits, Cranial Nerves II-XII are grossly intact.  Psychiatric: Normal interaction, Coherent speech, Euthymic mood, Appropriate affect       Assessment:       ICD-10-CM ICD-9-CM   1. Routine general medical examination at a health care facility  " Z00.00 V70.0   2. Generalized anxiety disorder  F41.1 300.02   3. Pseudopolyposis of colon with complication, unspecified part of colon  K51.419 556.4   4. Episode of recurrent major depressive disorder, unspecified depression episode severity  F33.9 296.30        Plan:         Health Maintenance Topics with due status: Not Due       Topic Last Completion Date    TETANUS VACCINE 10/14/2015    DEXA Scan 03/11/2021    Lipid Panel 08/30/2022        Colonoscopy- Last colonoscopy in 2016.     Osteoporosis Screening - Last DEXA on . Results show Normal Bone Density. Follow up in 1 year    Eye Exam - Recommend annually.    Dental Exam - Recommend biannual exams.     Vaccinations -   Immunization History   Administered Date(s) Administered    COVID-19, MRNA, LN-S, PF (MODERNA FULL 0.5 ML DOSE) 03/12/2021    Influenza - High Dose - PF (65 years and older) 10/16/2016, 09/26/2017, 09/04/2018    Influenza - Quadrivalent - High Dose - PF (65 years and older) 09/25/2020    Pneumococcal Conjugate - 13 Valent 10/14/2015    Pneumococcal Polysaccharide - 23 Valent 08/01/2013    Tdap 10/14/2015    Zoster 09/04/2018    Zoster Recombinant 05/24/2018, 09/04/2018            1. Routine general medical examination at a health care facility    2. Generalized anxiety disorder  -Started patient on duloxetine.   -Educated patient on the risks of serotonin based medications such as serotonin modulators and SSRIs/SNRIs.   Risks discussed include but were not limited to common side effects of the specific medications, risk for worsening symptoms of depression including development of suicidal thoughts or ideations, and serotonin syndrome.   Counseled patient on expected time course of treatment plan including potential benefits of medication not becoming noticeable until up to 6 weeks from start date. Plans include follow up in 2 weeks from today's office visit to review potential side effects and tolerance.   Patient voiced understanding of the  risks and plan.  3. Pseudopolyposis of colon with complication, unspecified part of colon  -Patient last colonoscopy in 2016. Will discuss in further detail at follow up appointment in 2 weeks, regarding plans for further colonoscopy with regards to previous results and her age.   4. Episode of recurrent major depressive disorder, unspecified depression episode severity       Medication List with Changes/Refills   New Medications    DULOXETINE (CYMBALTA) 30 MG CAPSULE    Take 1 capsule (30 mg total) by mouth once daily.       Start Date: 2022  End Date: 2023   Current Medications    ALBUTEROL (PROVENTIL/VENTOLIN HFA) 90 MCG/ACTUATION INHALER    Inhale into the lungs.       Start Date: 10/12/2021End Date: --    ATORVASTATIN (LIPITOR) 10 MG TABLET    Take 10 mg by mouth once daily.       Start Date: 2022  End Date: --    LEVOTHYROXINE (SYNTHROID) 50 MCG TABLET    Take 1 tablet (50 mcg total) by mouth before breakfast. Take 30min prior to other meds, food, drink       Start Date: 2022  End Date: --    MECLIZINE (ANTIVERT) 25 MG TABLET    Take by mouth as needed.       Start Date: 2022  End Date: --    MONTELUKAST (SINGULAIR) 10 MG TABLET    SMARTSI Tablet(s) By Mouth Every Evening       Start Date: 2022 End Date: --    OMEPRAZOLE (PRILOSEC) 40 MG CAPSULE    Take by mouth once daily.       Start Date: 2022  End Date: --    TIZANIDINE (ZANAFLEX) 4 MG TABLET    Take 4 mg by mouth nightly.       Start Date: 2022  End Date: --    TRAZODONE (DESYREL) 50 MG TABLET      See Instructions, TAKE 1 TO 3 TABLETS BY MOUTH EVERY DAY AT BEDTIME, # 90 tab(s), 2 Refill(s), Pharmacy: [facility] #[id number], 154, cm, Height/Length Dosing, 21 7:56:00 CDT, 66, kg, Weight Dosing, 21 7:56:00 CDT       Start Date: 2021 End Date: --    TRIAMCINOLONE (NASACORT) 55 MCG NASAL INHALER    by Nasal route.       Start Date: 10/14/2021End Date: --          The patient's Health Maintenance was  reviewed and the following appears to be due at this time:   Health Maintenance Due   Topic Date Due    Hepatitis C Screening  Never done    COVID-19 Vaccine (2 - Moderna series) 04/09/2021    Influenza Vaccine (1) 09/01/2022         Follow up in about 2 weeks (around 9/21/2022) for Follow up. In addition to their scheduled follow up, the patient has also been instructed to follow up on as needed basis.

## 2022-09-22 ENCOUNTER — OFFICE VISIT (OUTPATIENT)
Dept: FAMILY MEDICINE | Facility: CLINIC | Age: 77
End: 2022-09-22
Payer: MEDICARE

## 2022-09-22 VITALS
RESPIRATION RATE: 18 BRPM | OXYGEN SATURATION: 98 % | SYSTOLIC BLOOD PRESSURE: 110 MMHG | HEART RATE: 62 BPM | BODY MASS INDEX: 25.4 KG/M2 | HEIGHT: 62 IN | DIASTOLIC BLOOD PRESSURE: 69 MMHG | WEIGHT: 138 LBS | TEMPERATURE: 97 F

## 2022-09-22 DIAGNOSIS — F41.1 GENERALIZED ANXIETY DISORDER: Primary | ICD-10-CM

## 2022-09-22 PROCEDURE — 99213 OFFICE O/P EST LOW 20 MIN: CPT | Mod: ,,, | Performed by: FAMILY MEDICINE

## 2022-09-22 PROCEDURE — 99213 PR OFFICE/OUTPT VISIT, EST, LEVL III, 20-29 MIN: ICD-10-PCS | Mod: ,,, | Performed by: FAMILY MEDICINE

## 2022-09-22 RX ORDER — HYDRALAZINE HYDROCHLORIDE 25 MG/1
25 TABLET, FILM COATED ORAL EVERY 6 HOURS
Qty: 120 TABLET | Refills: 0 | Status: SHIPPED | OUTPATIENT
Start: 2022-09-22 | End: 2022-09-22

## 2022-09-22 RX ORDER — HYDROXYZINE HYDROCHLORIDE 25 MG/1
25 TABLET, FILM COATED ORAL 4 TIMES DAILY PRN
Qty: 120 TABLET | Refills: 0 | Status: SHIPPED | OUTPATIENT
Start: 2022-09-22 | End: 2022-10-17

## 2022-09-22 NOTE — PROGRESS NOTES
Patient ID: [id number]     Chief Complaint: Follow-up        HPI:     Olivia Lovell is a 77 y.o. female here today for Follow-up No other complaints today.         ----------------------------  Anxiety disorder, unspecified  Asthenia  Asthma  Cataract  Diverticulitis  Family history of breast cancer in first degree relative      Comment:  mother and sister  Fibromyalgia  GERD (gastroesophageal reflux disease)  Hiatal hernia  Hyperlipidemia  Hypothyroidism, unspecified  Insomnia  Major depression, recurrent  Migraines  Multiple pulmonary nodules  Osteoarthritis of both knees  Osteoporosis  PUD (peptic ulcer disease)  Sweating profusely  Thyroid disease  Verruca vulgaris     Past Surgical History:   Procedure Laterality Date    APPENDECTOMY  1952    BACK SURGERY  2011    CATARACT EXTRACTION Right     CHOLECYSTECTOMY  2014    COLONOSCOPY  2016    Dr Charly Forde    ESOPHAGOGASTRODUODENOSCOPY  05/14/2018    Dr Charly Forde    ESOPHAGOGASTRODUODENOSCOPY  11/19/2015    Dr Charly Forde    EYE SURGERY  08/19/2022    Cataract    NECK SURGERY  2010    ROBOT-ASSISTED NISSEN FUNDOPLICATION  12/10/2015    Dr Stewart Zhang    TONSILLECTOMY  1947    TOTAL ABDOMINAL HYSTERECTOMY  1975    TOTAL KNEE ARTHROPLASTY Bilateral 02/18/2019    Dr Mayco North       Review of patient's allergies indicates:   Allergen Reactions    Dairy-aid     Milk containing products     Mold     Propoxyphene     Pyrethrins-piperonyl butoxide        Outpatient Medications Marked as Taking for the 9/22/22 encounter (Office Visit) with Omar Meneses, DO   Medication Sig Dispense Refill    albuterol (PROVENTIL/VENTOLIN HFA) 90 mcg/actuation inhaler Inhale into the lungs.      atorvastatin (LIPITOR) 10 MG tablet Take 10 mg by mouth once daily.      DULoxetine (CYMBALTA) 30 MG capsule Take 1 capsule (30 mg total) by mouth once daily. 30 capsule 11    levothyroxine (SYNTHROID) 50 MCG tablet Take 1 tablet (50 mcg total) by mouth before breakfast. Take 30min  prior to other meds, food, drink 90 tablet 0    meclizine (ANTIVERT) 25 mg tablet Take by mouth as needed.      montelukast (SINGULAIR) 10 mg tablet SMARTSI Tablet(s) By Mouth Every Evening      omeprazole (PRILOSEC) 40 MG capsule Take by mouth once daily.      triamcinolone (NASACORT) 55 mcg nasal inhaler by Nasal route.      [DISCONTINUED] tiZANidine (ZANAFLEX) 4 MG tablet Take 4 mg by mouth nightly.         Social History     Socioeconomic History    Marital status:    Tobacco Use    Smoking status: Never    Smokeless tobacco: Never   Substance and Sexual Activity    Alcohol use: Never    Drug use: Never    Sexual activity: Not Currently     Birth control/protection: See Surgical Hx        Family History   Problem Relation Age of Onset    Breast cancer Mother         Cause of death    Diabetes Mellitus Mother     Coronary artery disease Mother     Hypertension Mother     Thyroid disease Mother     Arthritis Mother     Heart disease Mother     Coronary artery disease Father         Cause of death    Breast cancer Sister     Cancer Sister     Vision loss Maternal Grandfather         Patient Care Team:  Omar Meneses DO as PCP - General (Family Medicine)  Aditya Anton Jr., MD, [facility] as Consulting Physician (Pulmonary Disease)  Thomas Vasquez MD as Consulting Physician (Cardiology)  Stewart Sinclair MD as Consulting Physician (Urology)  Jet Sheppard MD as Consulting Physician (Gastroenterology)     Subjective:     Review of Systems   Constitutional:  Negative for chills and fever.   Respiratory:  Negative for shortness of breath.    Cardiovascular:  Negative for chest pain and palpitations.   Gastrointestinal:  Negative for abdominal pain, constipation, diarrhea and nausea.   Neurological:  Negative for headaches.   Psychiatric/Behavioral:  The patient is nervous/anxious.      See HPI for details  All Other ROS: Negative except as stated in HPI.       Objective:     /69   Pulse 62   Temp 97  "°F (36.1 °C) (Tympanic)   Resp 18   Ht 5' 1.81" (1.57 m)   Wt 62.6 kg (138 lb)   SpO2 98%   BMI 25.40 kg/m²     Physical Exam  Vitals reviewed.   Constitutional:       General: She is not in acute distress.     Appearance: Normal appearance.   Cardiovascular:      Rate and Rhythm: Normal rate and regular rhythm.      Heart sounds: No murmur heard.    No friction rub. No gallop.   Pulmonary:      Effort: No respiratory distress.      Breath sounds: No wheezing, rhonchi or rales.   Musculoskeletal:         General: No swelling, tenderness or deformity.      Right lower leg: No edema.      Left lower leg: No edema.   Skin:     General: Skin is warm and dry.      Findings: No lesion or rash.   Neurological:      General: No focal deficit present.      Mental Status: She is alert.   Psychiatric:         Mood and Affect: Mood normal.       Assessment/Plan:     1. Generalized anxiety disorder    Other orders  -     hydrOXYzine HCL (ATARAX) 25 MG tablet    -Duloxetine seems to be working somewhat though patient has only been on it for a few weeks. Expect further therapeutic benefit by the next follow up which is 6 weeks from starting the medication.     Follow up:     Follow up in about 4 weeks (around 10/20/2022) for Follow up. In addition to their scheduled follow up, the patient has also been instructed to follow up on as needed basis.           "

## 2022-09-22 NOTE — PROGRESS NOTES
Patient ID: [id number]     Chief Complaint: Follow-up        HPI:     Olivia Lovell is a 77 y.o. female here today for Follow-up  for anxiety. No other complaints today.         ----------------------------  Anxiety disorder, unspecified  Asthenia  Asthma  Cataract  Diverticulitis  Family history of breast cancer in first degree relative      Comment:  mother and sister  Fibromyalgia  GERD (gastroesophageal reflux disease)  Hiatal hernia  Hyperlipidemia  Hypothyroidism, unspecified  Insomnia  Major depression, recurrent  Migraines  Multiple pulmonary nodules  Osteoarthritis of both knees  Osteoporosis  PUD (peptic ulcer disease)  Sweating profusely  Thyroid disease  Verruca vulgaris     Past Surgical History:   Procedure Laterality Date    APPENDECTOMY  1952    BACK SURGERY  2011    CATARACT EXTRACTION Right     CHOLECYSTECTOMY  2014    COLONOSCOPY  2016    Dr Charly Forde    ESOPHAGOGASTRODUODENOSCOPY  05/14/2018    Dr Charly Forde    ESOPHAGOGASTRODUODENOSCOPY  11/19/2015    Dr Charly Fored    EYE SURGERY  08/19/2022    Cataract    NECK SURGERY  2010    ROBOT-ASSISTED NISSEN FUNDOPLICATION  12/10/2015    Dr Stewart Zhang    TONSILLECTOMY  1947    TOTAL ABDOMINAL HYSTERECTOMY  1975    TOTAL KNEE ARTHROPLASTY Bilateral 02/18/2019    Dr Mayco North       Review of patient's allergies indicates:   Allergen Reactions    Dairy-aid     Milk containing products     Mold     Propoxyphene     Pyrethrins-piperonyl butoxide        Outpatient Medications Marked as Taking for the 9/22/22 encounter (Office Visit) with Omar Meneses, DO   Medication Sig Dispense Refill    albuterol (PROVENTIL/VENTOLIN HFA) 90 mcg/actuation inhaler Inhale into the lungs.      atorvastatin (LIPITOR) 10 MG tablet Take 10 mg by mouth once daily.      DULoxetine (CYMBALTA) 30 MG capsule Take 1 capsule (30 mg total) by mouth once daily. 30 capsule 11    levothyroxine (SYNTHROID) 50 MCG tablet Take 1 tablet (50 mcg total) by mouth before  breakfast. Take 30min prior to other meds, food, drink 90 tablet 0    meclizine (ANTIVERT) 25 mg tablet Take by mouth as needed.      montelukast (SINGULAIR) 10 mg tablet SMARTSI Tablet(s) By Mouth Every Evening      omeprazole (PRILOSEC) 40 MG capsule Take by mouth once daily.      triamcinolone (NASACORT) 55 mcg nasal inhaler by Nasal route.      [DISCONTINUED] tiZANidine (ZANAFLEX) 4 MG tablet Take 4 mg by mouth nightly.         Social History     Socioeconomic History    Marital status:    Tobacco Use    Smoking status: Never    Smokeless tobacco: Never   Substance and Sexual Activity    Alcohol use: Never    Drug use: Never    Sexual activity: Not Currently     Birth control/protection: See Surgical Hx        Family History   Problem Relation Age of Onset    Breast cancer Mother         Cause of death    Diabetes Mellitus Mother     Coronary artery disease Mother     Hypertension Mother     Thyroid disease Mother     Arthritis Mother     Heart disease Mother     Coronary artery disease Father         Cause of death    Breast cancer Sister     Cancer Sister     Vision loss Maternal Grandfather         Patient Care Team:  Omar Meneses DO as PCP - General (Family Medicine)  Aditya Anton Jr., MD, [facility] as Consulting Physician (Pulmonary Disease)  Thomas Vasquez MD as Consulting Physician (Cardiology)  Stewart Sinclair MD as Consulting Physician (Urology)  Jet Sheppard MD as Consulting Physician (Gastroenterology)     Subjective:     Review of Systems   Constitutional:  Negative for chills and fever.   Respiratory:  Negative for shortness of breath.    Cardiovascular:  Negative for chest pain and palpitations.   Gastrointestinal:  Negative for abdominal pain, constipation, diarrhea and nausea.   Neurological:  Negative for headaches.   Psychiatric/Behavioral:  The patient is nervous/anxious.      See HPI for details  All Other ROS: Negative except as stated in HPI.       Objective:     /69   " Pulse 62   Temp 97 °F (36.1 °C) (Tympanic)   Resp 18   Ht 5' 1.81" (1.57 m)   Wt 62.6 kg (138 lb)   SpO2 98%   BMI 25.40 kg/m²     Physical Exam  Vitals reviewed.   Constitutional:       General: She is not in acute distress.     Appearance: Normal appearance.   Cardiovascular:      Rate and Rhythm: Normal rate and regular rhythm.      Heart sounds: No murmur heard.    No friction rub. No gallop.   Pulmonary:      Effort: No respiratory distress.      Breath sounds: No wheezing, rhonchi or rales.   Musculoskeletal:         General: No swelling, tenderness or deformity.      Right lower leg: No edema.      Left lower leg: No edema.   Skin:     General: Skin is warm and dry.      Findings: No lesion or rash.   Neurological:      General: No focal deficit present.      Mental Status: She is alert.   Psychiatric:         Mood and Affect: Mood normal.       Assessment/Plan:     1. Generalized anxiety disorder    Other orders  -     hydrOXYzine HCL (ATARAX) 25 MG tablet    -Duloxetine seems to be working somewhat though patient has only been on it for a few weeks. Expect further therapeutic benefit by the next follow up which is 6 weeks from starting the medication.     Follow up:     Follow up in about 4 weeks (around 10/20/2022) for Follow up. In addition to their scheduled follow up, the patient has also been instructed to follow up on as needed basis.           "

## 2022-10-16 DIAGNOSIS — E03.9 HYPOTHYROIDISM, UNSPECIFIED TYPE: Primary | ICD-10-CM

## 2022-10-17 RX ORDER — LEVOTHYROXINE SODIUM 50 UG/1
TABLET ORAL
Qty: 90 TABLET | Refills: 3 | Status: SHIPPED | OUTPATIENT
Start: 2022-10-17 | End: 2023-08-29

## 2022-10-20 ENCOUNTER — OFFICE VISIT (OUTPATIENT)
Dept: FAMILY MEDICINE | Facility: CLINIC | Age: 77
End: 2022-10-20
Payer: MEDICARE

## 2022-10-20 VITALS
SYSTOLIC BLOOD PRESSURE: 125 MMHG | TEMPERATURE: 98 F | DIASTOLIC BLOOD PRESSURE: 77 MMHG | WEIGHT: 137.19 LBS | RESPIRATION RATE: 18 BRPM | HEIGHT: 61 IN | BODY MASS INDEX: 25.9 KG/M2 | OXYGEN SATURATION: 98 % | HEART RATE: 66 BPM

## 2022-10-20 DIAGNOSIS — G44.229 CHRONIC TENSION-TYPE HEADACHE, NOT INTRACTABLE: ICD-10-CM

## 2022-10-20 DIAGNOSIS — F41.1 GENERALIZED ANXIETY DISORDER: Primary | ICD-10-CM

## 2022-10-20 DIAGNOSIS — Z12.83 SCREENING FOR SKIN CANCER: ICD-10-CM

## 2022-10-20 PROCEDURE — 99214 OFFICE O/P EST MOD 30 MIN: CPT | Mod: ,,, | Performed by: FAMILY MEDICINE

## 2022-10-20 PROCEDURE — 99214 PR OFFICE/OUTPT VISIT, EST, LEVL IV, 30-39 MIN: ICD-10-PCS | Mod: ,,, | Performed by: FAMILY MEDICINE

## 2022-10-20 RX ORDER — DULOXETIN HYDROCHLORIDE 30 MG/1
30 CAPSULE, DELAYED RELEASE ORAL DAILY
Qty: 90 CAPSULE | Refills: 1 | Status: SHIPPED | OUTPATIENT
Start: 2022-10-20 | End: 2023-04-17 | Stop reason: SDUPTHER

## 2022-10-20 NOTE — PROGRESS NOTES
Patient ID: [id number]     Chief Complaint: Follow-up        HPI:     Olivia Lovell is a 77 y.o. female here today for Follow-up for anxiety. States the duloxetine is working very well. Complains of chronic headaches that were present before the medication was started. Tension headache type pattern. States she sleeps with 3 pillows which may be contributing to her neck pain and headaches.         ----------------------------  Anxiety disorder, unspecified  Asthenia  Asthma  Cataract  Diverticulitis  Family history of breast cancer in first degree relative      Comment:  mother and sister  Fibromyalgia  GERD (gastroesophageal reflux disease)  Hiatal hernia  Hyperlipidemia  Hypothyroidism, unspecified  Insomnia  Major depression, recurrent  Migraines  Multiple pulmonary nodules  Osteoarthritis of both knees  Osteoporosis  PUD (peptic ulcer disease)  Sweating profusely  Thyroid disease  Verruca vulgaris     Past Surgical History:   Procedure Laterality Date    APPENDECTOMY  1952    BACK SURGERY  2011    CATARACT EXTRACTION Right     CHOLECYSTECTOMY  2014    COLONOSCOPY  2016    Dr Charly Forde    ESOPHAGOGASTRODUODENOSCOPY  05/14/2018    Dr Charly Forde    ESOPHAGOGASTRODUODENOSCOPY  11/19/2015    Dr Charly Forde    EYE SURGERY  08/19/2022    Cataract    NECK SURGERY  2010    ROBOT-ASSISTED NISSEN FUNDOPLICATION  12/10/2015    Dr Stewart Zhang    TONSILLECTOMY  1947    TOTAL ABDOMINAL HYSTERECTOMY  1975    TOTAL KNEE ARTHROPLASTY Bilateral 02/18/2019    Dr Mayco North       Review of patient's allergies indicates:   Allergen Reactions    Dairy-aid     Milk containing products     Mold     Propoxyphene     Pyrethrins-piperonyl butoxide        Outpatient Medications Marked as Taking for the 10/20/22 encounter (Office Visit) with Omar Meneses, DO   Medication Sig Dispense Refill    albuterol (PROVENTIL/VENTOLIN HFA) 90 mcg/actuation inhaler Inhale into the lungs.      atorvastatin (LIPITOR) 10 MG tablet Take 10 mg  by mouth once daily.      calcium carbonate/vitamin D3 (VITAMIN D-3 ORAL) Take by mouth.      cetirizine (ZYRTEC) 10 MG tablet Take 10 mg by mouth once daily.      fluticasone-salmeterol diskus inhaler 250-50 mcg Inhale 1 puff into the lungs 2 (two) times daily. Controller      levothyroxine (SYNTHROID) 50 MCG tablet TAKE 1 TABLET BY MOUTH EVERY DAY 90 tablet 3    meclizine (ANTIVERT) 25 mg tablet Take by mouth as needed.      montelukast (SINGULAIR) 10 mg tablet SMARTSI Tablet(s) By Mouth Every Evening      multivitamin capsule Take 1 capsule by mouth once daily.      omeprazole (PRILOSEC) 40 MG capsule Take by mouth once daily.      tiotropium (SPIRIVA) 18 mcg inhalation capsule Inhale 18 mcg into the lungs once daily. Controller      triamcinolone (NASACORT) 55 mcg nasal inhaler 1 spray by Nasal route every evening. 16.9 mL 11       Social History     Socioeconomic History    Marital status:    Tobacco Use    Smoking status: Never    Smokeless tobacco: Never   Substance and Sexual Activity    Alcohol use: Never    Drug use: Never    Sexual activity: Not Currently     Birth control/protection: See Surgical Hx        Family History   Problem Relation Age of Onset    Breast cancer Mother         Cause of death    Diabetes Mellitus Mother     Coronary artery disease Mother     Hypertension Mother     Thyroid disease Mother     Arthritis Mother     Heart disease Mother     Coronary artery disease Father         Cause of death    Breast cancer Sister     Cancer Sister     Vision loss Maternal Grandfather         Patient Care Team:  Omar Meneses DO as PCP - General (Family Medicine)  Aditya Anton Jr., MD, [facility] as Consulting Physician (Pulmonary Disease)  Thomas Vasquez MD as Consulting Physician (Cardiology)  Stewart Sinclair MD as Consulting Physician (Urology)  Jet Sheppard MD as Consulting Physician (Gastroenterology)     Subjective:     Review of Systems   Constitutional:  Positive for  "diaphoresis. Negative for chills and fever.   Respiratory:  Negative for shortness of breath.    Cardiovascular:  Negative for chest pain and palpitations.   Musculoskeletal:  Positive for neck pain.   Neurological:  Positive for headaches.   Psychiatric/Behavioral:  The patient is nervous/anxious.      See HPI for details  All Other ROS: Negative except as stated in HPI.       Objective:     /77   Pulse 66   Temp 97.5 °F (36.4 °C) (Tympanic)   Resp 18   Ht 5' 0.63" (1.54 m)   Wt 62.2 kg (137 lb 3.2 oz)   SpO2 98%   BMI 26.24 kg/m²     Physical Exam  Vitals reviewed.   Constitutional:       General: She is not in acute distress.     Appearance: Normal appearance.   Cardiovascular:      Rate and Rhythm: Normal rate and regular rhythm.      Heart sounds: No murmur heard.    No friction rub. No gallop.   Pulmonary:      Effort: No respiratory distress.      Breath sounds: No wheezing, rhonchi or rales.   Musculoskeletal:         General: No swelling, tenderness or deformity.      Right lower leg: No edema.      Left lower leg: No edema.   Skin:     General: Skin is warm and dry.      Findings: No lesion or rash.   Neurological:      General: No focal deficit present.      Mental Status: She is alert.   Psychiatric:         Mood and Affect: Mood normal.       Assessment/Plan:     1. Generalized anxiety disorder  -     DULoxetine (CYMBALTA) 30 MG capsule; Take 1 capsule (30 mg total) by mouth once daily.  Dispense: 90 capsule; Refill: 1    2. Screening for skin cancer  -     Ambulatory referral/consult to Dermatology; Future; Expected date: 10/21/2022    3. Chronic tension-type headache, not intractable    Recommended patient try to sleep with fewer pillows.     Follow up:     Follow up in about 6 months (around 4/20/2023) for Follow up. In addition to their scheduled follow up, the patient has also been instructed to follow up on as needed basis.           "

## 2022-12-05 DIAGNOSIS — E78.2 MIXED HYPERLIPIDEMIA: Primary | ICD-10-CM

## 2022-12-05 RX ORDER — ATORVASTATIN CALCIUM 10 MG/1
10 TABLET, FILM COATED ORAL DAILY
Qty: 30 TABLET | Refills: 2 | Status: SHIPPED | OUTPATIENT
Start: 2022-12-05 | End: 2023-12-19 | Stop reason: SDUPTHER

## 2022-12-12 DIAGNOSIS — K21.9 GASTROESOPHAGEAL REFLUX DISEASE, UNSPECIFIED WHETHER ESOPHAGITIS PRESENT: Primary | ICD-10-CM

## 2022-12-12 PROBLEM — Z00.00 MEDICARE ANNUAL WELLNESS VISIT, SUBSEQUENT: Status: RESOLVED | Noted: 2022-09-07 | Resolved: 2022-12-12

## 2022-12-12 RX ORDER — OMEPRAZOLE 40 MG/1
40 CAPSULE, DELAYED RELEASE ORAL DAILY
Qty: 30 CAPSULE | Refills: 2 | Status: SHIPPED | OUTPATIENT
Start: 2022-12-12 | End: 2023-05-10 | Stop reason: SDUPTHER

## 2023-04-06 DIAGNOSIS — F41.1 GENERALIZED ANXIETY DISORDER: Primary | ICD-10-CM

## 2023-04-06 RX ORDER — HYDROXYZINE HYDROCHLORIDE 25 MG/1
25 TABLET, FILM COATED ORAL 3 TIMES DAILY PRN
COMMUNITY
End: 2023-04-06 | Stop reason: SDUPTHER

## 2023-04-06 RX ORDER — HYDROXYZINE HYDROCHLORIDE 25 MG/1
25 TABLET, FILM COATED ORAL 3 TIMES DAILY PRN
Qty: 60 TABLET | Refills: 1 | Status: SHIPPED | OUTPATIENT
Start: 2023-04-06 | End: 2023-04-10 | Stop reason: SDUPTHER

## 2023-04-10 DIAGNOSIS — F41.1 GENERALIZED ANXIETY DISORDER: ICD-10-CM

## 2023-04-10 RX ORDER — HYDROXYZINE HYDROCHLORIDE 25 MG/1
25 TABLET, FILM COATED ORAL 3 TIMES DAILY PRN
Qty: 60 TABLET | Refills: 5 | Status: SHIPPED | OUTPATIENT
Start: 2023-04-10 | End: 2023-04-11

## 2023-04-11 DIAGNOSIS — F41.1 GENERALIZED ANXIETY DISORDER: Primary | ICD-10-CM

## 2023-04-11 RX ORDER — HYDROXYZINE PAMOATE 25 MG/1
25 CAPSULE ORAL EVERY 8 HOURS PRN
Qty: 60 CAPSULE | Refills: 1 | Status: SHIPPED | OUTPATIENT
Start: 2023-04-11 | End: 2023-04-13 | Stop reason: SDUPTHER

## 2023-04-13 DIAGNOSIS — F41.1 GENERALIZED ANXIETY DISORDER: ICD-10-CM

## 2023-04-13 RX ORDER — HYDROXYZINE PAMOATE 25 MG/1
25 CAPSULE ORAL EVERY 8 HOURS PRN
Qty: 60 CAPSULE | Refills: 1 | Status: SHIPPED | OUTPATIENT
Start: 2023-04-13

## 2023-04-17 ENCOUNTER — OFFICE VISIT (OUTPATIENT)
Dept: FAMILY MEDICINE | Facility: CLINIC | Age: 78
End: 2023-04-17
Payer: MEDICARE

## 2023-04-17 ENCOUNTER — LAB VISIT (OUTPATIENT)
Dept: LAB | Facility: HOSPITAL | Age: 78
End: 2023-04-17
Attending: FAMILY MEDICINE
Payer: MEDICARE

## 2023-04-17 VITALS
DIASTOLIC BLOOD PRESSURE: 70 MMHG | HEIGHT: 61 IN | RESPIRATION RATE: 16 BRPM | WEIGHT: 138 LBS | SYSTOLIC BLOOD PRESSURE: 104 MMHG | TEMPERATURE: 97 F | BODY MASS INDEX: 26.06 KG/M2 | OXYGEN SATURATION: 95 % | HEART RATE: 76 BPM

## 2023-04-17 DIAGNOSIS — N30.90 CYSTITIS: Primary | ICD-10-CM

## 2023-04-17 DIAGNOSIS — R25.2 LEG CRAMPS: ICD-10-CM

## 2023-04-17 DIAGNOSIS — F41.1 GENERALIZED ANXIETY DISORDER: ICD-10-CM

## 2023-04-17 DIAGNOSIS — N30.90 CYSTITIS: ICD-10-CM

## 2023-04-17 LAB
ALBUMIN SERPL-MCNC: 4 G/DL (ref 3.4–4.8)
ALBUMIN/GLOB SERPL: 1.3 RATIO (ref 1.1–2)
ALP SERPL-CCNC: 101 UNIT/L (ref 40–150)
ALT SERPL-CCNC: 19 UNIT/L (ref 0–55)
APPEARANCE UR: CLEAR
AST SERPL-CCNC: 20 UNIT/L (ref 5–34)
BACTERIA #/AREA URNS AUTO: ABNORMAL /HPF
BASOPHILS # BLD AUTO: 0.04 X10(3)/MCL (ref 0–0.2)
BASOPHILS NFR BLD AUTO: 0.5 %
BILIRUB UR QL STRIP.AUTO: NEGATIVE MG/DL
BILIRUBIN DIRECT+TOT PNL SERPL-MCNC: 0.9 MG/DL
BUN SERPL-MCNC: 15 MG/DL (ref 9.8–20.1)
CALCIUM SERPL-MCNC: 9.9 MG/DL (ref 8.4–10.2)
CHLORIDE SERPL-SCNC: 107 MMOL/L (ref 98–107)
CO2 SERPL-SCNC: 25 MMOL/L (ref 23–31)
COLOR UR AUTO: YELLOW
CREAT SERPL-MCNC: 0.87 MG/DL (ref 0.55–1.02)
EOSINOPHIL # BLD AUTO: 0.09 X10(3)/MCL (ref 0–0.9)
EOSINOPHIL NFR BLD AUTO: 1.1 %
ERYTHROCYTE [DISTWIDTH] IN BLOOD BY AUTOMATED COUNT: 12 % (ref 11.5–17)
GFR SERPLBLD CREATININE-BSD FMLA CKD-EPI: >60 MLS/MIN/1.73/M2
GLOBULIN SER-MCNC: 3.2 GM/DL (ref 2.4–3.5)
GLUCOSE SERPL-MCNC: 100 MG/DL (ref 82–115)
GLUCOSE UR QL STRIP.AUTO: NEGATIVE MG/DL
HCT VFR BLD AUTO: 42.1 % (ref 37–47)
HGB BLD-MCNC: 14.1 G/DL (ref 12–16)
IMM GRANULOCYTES # BLD AUTO: 0.02 X10(3)/MCL (ref 0–0.04)
IMM GRANULOCYTES NFR BLD AUTO: 0.3 %
KETONES UR QL STRIP.AUTO: NEGATIVE MG/DL
LEUKOCYTE ESTERASE UR QL STRIP.AUTO: ABNORMAL UNIT/L
LYMPHOCYTES # BLD AUTO: 3.44 X10(3)/MCL (ref 0.6–4.6)
LYMPHOCYTES NFR BLD AUTO: 43.3 %
MAGNESIUM SERPL-MCNC: 2.6 MG/DL (ref 1.6–2.6)
MCH RBC QN AUTO: 31.5 PG (ref 27–31)
MCHC RBC AUTO-ENTMCNC: 33.5 G/DL (ref 33–36)
MCV RBC AUTO: 94 FL (ref 80–94)
MONOCYTES # BLD AUTO: 0.54 X10(3)/MCL (ref 0.1–1.3)
MONOCYTES NFR BLD AUTO: 6.8 %
NEUTROPHILS # BLD AUTO: 3.82 X10(3)/MCL (ref 2.1–9.2)
NEUTROPHILS NFR BLD AUTO: 48 %
NITRITE UR QL STRIP.AUTO: NEGATIVE
NRBC BLD AUTO-RTO: 0 %
PH UR STRIP.AUTO: 8 [PH]
PLATELET # BLD AUTO: 279 X10(3)/MCL (ref 130–400)
PMV BLD AUTO: 9.5 FL (ref 7.4–10.4)
POTASSIUM SERPL-SCNC: 4.8 MMOL/L (ref 3.5–5.1)
PROT SERPL-MCNC: 7.2 GM/DL (ref 5.8–7.6)
PROT UR QL STRIP.AUTO: NEGATIVE MG/DL
RBC # BLD AUTO: 4.48 X10(6)/MCL (ref 4.2–5.4)
RBC #/AREA URNS AUTO: ABNORMAL /HPF
RBC UR QL AUTO: NEGATIVE UNIT/L
SODIUM SERPL-SCNC: 140 MMOL/L (ref 136–145)
SP GR UR STRIP.AUTO: 1.01
SQUAMOUS #/AREA URNS AUTO: ABNORMAL /HPF
UROBILINOGEN UR STRIP-ACNC: 0.2 MG/DL
WBC # SPEC AUTO: 8 X10(3)/MCL (ref 4.5–11.5)
WBC #/AREA URNS AUTO: ABNORMAL /HPF

## 2023-04-17 PROCEDURE — 80053 COMPREHEN METABOLIC PANEL: CPT

## 2023-04-17 PROCEDURE — 99214 PR OFFICE/OUTPT VISIT, EST, LEVL IV, 30-39 MIN: ICD-10-PCS | Mod: ,,, | Performed by: FAMILY MEDICINE

## 2023-04-17 PROCEDURE — 36415 COLL VENOUS BLD VENIPUNCTURE: CPT

## 2023-04-17 PROCEDURE — 81001 URINALYSIS AUTO W/SCOPE: CPT

## 2023-04-17 PROCEDURE — 85025 COMPLETE CBC W/AUTO DIFF WBC: CPT

## 2023-04-17 PROCEDURE — 83735 ASSAY OF MAGNESIUM: CPT

## 2023-04-17 PROCEDURE — 99214 OFFICE O/P EST MOD 30 MIN: CPT | Mod: ,,, | Performed by: FAMILY MEDICINE

## 2023-04-17 RX ORDER — DULOXETIN HYDROCHLORIDE 30 MG/1
30 CAPSULE, DELAYED RELEASE ORAL DAILY
Qty: 90 CAPSULE | Refills: 1 | Status: SHIPPED | OUTPATIENT
Start: 2023-04-17 | End: 2023-10-16

## 2023-04-17 NOTE — PROGRESS NOTES
Patient ID: [id number]     Chief Complaint: Follow-up        HPI:     Olivia Lovell is a 77 y.o. female here today for Follow-up. Experiencing chronic UTIs and has been taking Azo, has not been on antibiotics for it. Has not been taking her duloxetine.       ----------------------------  Anxiety disorder, unspecified  Asthenia  Asthma  Carpal tunnel syndrome, bilateral  Cataract  Diverticulitis  Family history of breast cancer in first degree relative      Comment:  mother and sister  Fibromyalgia  GERD (gastroesophageal reflux disease)  Hiatal hernia  Hyperlipidemia  Hypothyroidism, unspecified  Insomnia  Major depression, recurrent  Migraines  Multiple pulmonary nodules  Osteoarthritis of both knees  Osteoporosis  PUD (peptic ulcer disease)  Sweating profusely  Thyroid disease  Verruca vulgaris     Past Surgical History:   Procedure Laterality Date    APPENDECTOMY  1952    BACK SURGERY  2011    CATARACT EXTRACTION Right     CHOLECYSTECTOMY  2014    COLONOSCOPY  2016    Dr Charly Forde    ESOPHAGOGASTRODUODENOSCOPY  05/14/2018    Dr Charly Forde    ESOPHAGOGASTRODUODENOSCOPY  11/19/2015    Dr Charly Forde    EYE SURGERY  08/19/2022    Cataract    NECK SURGERY  2010    ROBOT-ASSISTED NISSEN FUNDOPLICATION  12/10/2015    Dr Stewart Zhang    TONSILLECTOMY  1947    TOTAL ABDOMINAL HYSTERECTOMY  1975    TOTAL KNEE ARTHROPLASTY Bilateral 02/18/2019    Dr Mayco North       Review of patient's allergies indicates:   Allergen Reactions    Dairy-aid     Milk containing products     Mold     Propoxyphene     Pyrethrins-piperonyl butoxide        Outpatient Medications Marked as Taking for the 4/17/23 encounter (Office Visit) with Omar Meneses, DO   Medication Sig Dispense Refill    albuterol (PROVENTIL/VENTOLIN HFA) 90 mcg/actuation inhaler Inhale 1 puff into the lungs every 4 (four) hours as needed for Shortness of Breath or Wheezing.      atorvastatin (LIPITOR) 10 MG tablet Take 1 tablet (10 mg total) by mouth once  daily. 30 tablet 2    calcium carbonate/vitamin D3 (VITAMIN D-3 ORAL) Take 1 tablet by mouth Daily.      cetirizine (ZYRTEC) 10 MG tablet Take 10 mg by mouth once daily.      fluticasone-salmeterol diskus inhaler 250-50 mcg Inhale 1 puff into the lungs 2 (two) times daily. Controller      hydrOXYzine pamoate (VISTARIL) 25 MG Cap Take 1 capsule (25 mg total) by mouth every 8 (eight) hours as needed. 60 capsule 1    levothyroxine (SYNTHROID) 50 MCG tablet TAKE 1 TABLET BY MOUTH EVERY DAY 90 tablet 3    meclizine (ANTIVERT) 25 mg tablet Take 25 mg by mouth as needed for Dizziness.      montelukast (SINGULAIR) 10 mg tablet SMARTSI Tablet(s) By Mouth Every Evening 30 tablet 11    multivitamin capsule Take 1 capsule by mouth once daily.      omeprazole (PRILOSEC) 40 MG capsule Take 1 capsule (40 mg total) by mouth once daily. 30 capsule 2    tiotropium (SPIRIVA) 18 mcg inhalation capsule Inhale 18 mcg into the lungs once daily. Controller      triamcinolone (NASACORT) 55 mcg nasal inhaler 1 spray by Nasal route every evening. 16.9 mL 11       Social History     Socioeconomic History    Marital status:    Tobacco Use    Smoking status: Never    Smokeless tobacco: Never   Substance and Sexual Activity    Alcohol use: Never    Drug use: Never    Sexual activity: Not Currently     Birth control/protection: See Surgical Hx        Family History   Problem Relation Age of Onset    Breast cancer Mother         Cause of death    Diabetes Mellitus Mother     Coronary artery disease Mother     Hypertension Mother     Thyroid disease Mother     Arthritis Mother     Heart disease Mother     Coronary artery disease Father         Cause of death    Breast cancer Sister     Cancer Sister     Vision loss Maternal Grandfather         Patient Care Team:  Omar Meneses DO as PCP - General (Family Medicine)  [facility] as Consulting Physician (Pulmonary Disease)  Thomas Vasquez MD as Consulting Physician  "(Cardiology)  Stewart Sinclair MD as Consulting Physician (Urology)  Jet Sheppard MD as Consulting Physician (Gastroenterology)  Magdaleno Garcia MD as Consulting Physician (Orthopedic Surgery)     Subjective:     Review of Systems   Constitutional:  Positive for fever.   Respiratory:  Negative for shortness of breath.    Cardiovascular:  Negative for chest pain.   Gastrointestinal:  Negative for constipation and diarrhea.   Genitourinary:  Positive for dysuria, flank pain and frequency.   Musculoskeletal:  Positive for myalgias.   Neurological:  Negative for headaches.   Psychiatric/Behavioral:  The patient is nervous/anxious.      See HPI for details  All Other ROS: Negative except as stated in HPI.       Objective:     /70   Pulse 76   Temp 97.3 °F (36.3 °C) (Tympanic)   Resp 16   Ht 5' 0.63" (1.54 m)   Wt 62.6 kg (138 lb)   SpO2 95%   BMI 26.39 kg/m²     Physical Exam  Vitals reviewed.   Constitutional:       General: She is not in acute distress.     Appearance: Normal appearance.   Cardiovascular:      Rate and Rhythm: Normal rate and regular rhythm.      Heart sounds: No murmur heard.    No friction rub. No gallop.   Pulmonary:      Effort: No respiratory distress.      Breath sounds: No wheezing, rhonchi or rales.   Musculoskeletal:         General: No swelling, tenderness or deformity.      Right lower leg: No edema.      Left lower leg: No edema.   Skin:     General: Skin is warm and dry.      Findings: No lesion or rash.   Neurological:      General: No focal deficit present.      Mental Status: She is alert.   Psychiatric:         Mood and Affect: Mood normal.       Assessment/Plan:     1. Cystitis  -     Urinalysis, Reflex to Urine Culture; Future; Expected date: 04/17/2023  -     Comprehensive Metabolic Panel; Future; Expected date: 04/17/2023  -     CBC Auto Differential; Future; Expected date: 04/17/2023    2. Generalized anxiety disorder  -     DULoxetine (CYMBALTA) 30 MG capsule; " Take 1 capsule (30 mg total) by mouth once daily.  Dispense: 90 capsule; Refill: 1    3. Leg cramps  -     Magnesium; Future; Expected date: 04/17/2023          Follow up:     Follow up in about 4 weeks (around 5/15/2023) for Follow up. In addition to their scheduled follow up, the patient has also been instructed to follow up on as needed basis.

## 2023-05-10 ENCOUNTER — TELEPHONE (OUTPATIENT)
Dept: FAMILY MEDICINE | Facility: CLINIC | Age: 78
End: 2023-05-10
Payer: MEDICARE

## 2023-05-10 DIAGNOSIS — K21.9 GASTROESOPHAGEAL REFLUX DISEASE, UNSPECIFIED WHETHER ESOPHAGITIS PRESENT: ICD-10-CM

## 2023-05-10 RX ORDER — OMEPRAZOLE 40 MG/1
40 CAPSULE, DELAYED RELEASE ORAL DAILY
Qty: 30 CAPSULE | Refills: 5 | Status: SHIPPED | OUTPATIENT
Start: 2023-05-10 | End: 2023-11-21 | Stop reason: SDUPTHER

## 2023-05-10 NOTE — TELEPHONE ENCOUNTER
----- Message from Farida Owen sent at 5/10/2023  1:07 PM CDT -----  Regarding: refill  omeprazole (PRILOSEC) 40 MG capsule, Mrs Baker called saying she needs a refill sent to [facility] [place]       thanks

## 2023-05-23 ENCOUNTER — PATIENT MESSAGE (OUTPATIENT)
Dept: RESEARCH | Facility: HOSPITAL | Age: 78
End: 2023-05-23
Payer: MEDICARE

## 2023-06-12 ENCOUNTER — OFFICE VISIT (OUTPATIENT)
Dept: FAMILY MEDICINE | Facility: CLINIC | Age: 78
End: 2023-06-12
Payer: MEDICARE

## 2023-06-12 VITALS
TEMPERATURE: 97 F | WEIGHT: 139 LBS | OXYGEN SATURATION: 98 % | HEART RATE: 77 BPM | HEIGHT: 61 IN | DIASTOLIC BLOOD PRESSURE: 73 MMHG | SYSTOLIC BLOOD PRESSURE: 118 MMHG | RESPIRATION RATE: 18 BRPM | BODY MASS INDEX: 26.24 KG/M2

## 2023-06-12 DIAGNOSIS — F41.1 GENERALIZED ANXIETY DISORDER: Primary | ICD-10-CM

## 2023-06-12 DIAGNOSIS — F33.9 EPISODE OF RECURRENT MAJOR DEPRESSIVE DISORDER, UNSPECIFIED DEPRESSION EPISODE SEVERITY: ICD-10-CM

## 2023-06-12 DIAGNOSIS — G47.00 INSOMNIA, UNSPECIFIED TYPE: ICD-10-CM

## 2023-06-12 PROCEDURE — 99214 PR OFFICE/OUTPT VISIT, EST, LEVL IV, 30-39 MIN: ICD-10-PCS | Mod: ,,, | Performed by: FAMILY MEDICINE

## 2023-06-12 PROCEDURE — 99214 OFFICE O/P EST MOD 30 MIN: CPT | Mod: ,,, | Performed by: FAMILY MEDICINE

## 2023-06-12 NOTE — PROGRESS NOTES
Patient ID: [id number]     Chief Complaint: Follow-up        HPI:     Olivia Lovell is a 78 y.o. female here today for Follow-up for anxiety. Symptoms of anxiety have improved since restarting the duloxetine. Still having frequent headaches and not sleeping well still.       ----------------------------  Anxiety disorder, unspecified  Asthenia  Asthma  Carpal tunnel syndrome, bilateral  Cataract  Diverticulitis  Family history of breast cancer in first degree relative      Comment:  mother and sister  Fibromyalgia  GERD (gastroesophageal reflux disease)  Hiatal hernia  Hyperlipidemia  Hypothyroidism, unspecified  Insomnia  Major depression, recurrent  Migraines  Multiple pulmonary nodules  Osteoarthritis of both knees  Osteoporosis  PUD (peptic ulcer disease)  Sweating profusely  Thyroid disease  Verruca vulgaris     Past Surgical History:   Procedure Laterality Date    APPENDECTOMY  1952    BACK SURGERY  2011    CATARACT EXTRACTION Right     CHOLECYSTECTOMY  2014    COLONOSCOPY  2016    Dr Charly Forde    ESOPHAGOGASTRODUODENOSCOPY  05/14/2018    Dr Charly Forde    ESOPHAGOGASTRODUODENOSCOPY  11/19/2015    Dr Charly Forde    EYE SURGERY  08/19/2022    Cataract    NECK SURGERY  2010    ROBOT-ASSISTED NISSEN FUNDOPLICATION  12/10/2015    Dr Stewart Zhang    TONSILLECTOMY  1947    TOTAL ABDOMINAL HYSTERECTOMY  1975    TOTAL KNEE ARTHROPLASTY Bilateral 02/18/2019    Dr Mayco North       Review of patient's allergies indicates:   Allergen Reactions    Dairy-aid     Milk containing products (dairy)     Mold     Propoxyphene     Pyrethrins-piperonyl butoxide        Outpatient Medications Marked as Taking for the 6/12/23 encounter (Office Visit) with Oamr Meneses, DO   Medication Sig Dispense Refill    albuterol (PROVENTIL/VENTOLIN HFA) 90 mcg/actuation inhaler Inhale 1 puff into the lungs every 4 (four) hours as needed for Shortness of Breath or Wheezing.      atorvastatin (LIPITOR) 10 MG tablet Take 1 tablet (10  mg total) by mouth once daily. 30 tablet 2    calcium carbonate/vitamin D3 (VITAMIN D-3 ORAL) Take 1 tablet by mouth Daily.      cetirizine (ZYRTEC) 10 MG tablet Take 10 mg by mouth once daily.      DULoxetine (CYMBALTA) 30 MG capsule Take 1 capsule (30 mg total) by mouth once daily. 90 capsule 1    fluticasone-salmeterol diskus inhaler 250-50 mcg Inhale 1 puff into the lungs 2 (two) times daily. Controller      hydrOXYzine pamoate (VISTARIL) 25 MG Cap Take 1 capsule (25 mg total) by mouth every 8 (eight) hours as needed. 60 capsule 1    levothyroxine (SYNTHROID) 50 MCG tablet TAKE 1 TABLET BY MOUTH EVERY DAY 90 tablet 3    meclizine (ANTIVERT) 25 mg tablet Take 25 mg by mouth as needed for Dizziness.      montelukast (SINGULAIR) 10 mg tablet SMARTSI Tablet(s) By Mouth Every Evening 30 tablet 11    multivitamin capsule Take 1 capsule by mouth once daily.      omeprazole (PRILOSEC) 40 MG capsule Take 1 capsule (40 mg total) by mouth once daily. 30 capsule 5    tiotropium (SPIRIVA) 18 mcg inhalation capsule Inhale 18 mcg into the lungs once daily. Controller      triamcinolone (NASACORT) 55 mcg nasal inhaler 1 spray by Nasal route every evening. 16.9 mL 11       Social History     Socioeconomic History    Marital status:    Tobacco Use    Smoking status: Never    Smokeless tobacco: Never   Substance and Sexual Activity    Alcohol use: Never    Drug use: Never    Sexual activity: Not Currently     Birth control/protection: See Surgical Hx     Social Determinants of Health     Financial Resource Strain: Low Risk     Difficulty of Paying Living Expenses: Not hard at all   Food Insecurity: No Food Insecurity    Worried About Running Out of Food in the Last Year: Never true    Ran Out of Food in the Last Year: Never true   Transportation Needs: No Transportation Needs    Lack of Transportation (Medical): No    Lack of Transportation (Non-Medical): No   Physical Activity: Sufficiently Active    Days of Exercise  per Week: 5 days    Minutes of Exercise per Session: 60 min   Stress: No Stress Concern Present    Feeling of Stress : Only a little   Social Connections: Socially Integrated    Frequency of Communication with Friends and Family: More than three times a week    Frequency of Social Gatherings with Friends and Family: More than three times a week    Attends J[employer] Services: 1 to 4 times per year    Active Member of Clubs or Organizations: Yes    Marital Status:    Housing Stability: Low Risk     Unable to Pay for Housing in the Last Year: No    Number of Places Lived in the Last Year: 1    Unstable Housing in the Last Year: No        Family History   Problem Relation Age of Onset    Breast cancer Mother         Cause of death    Diabetes Mellitus Mother     Coronary artery disease Mother     Hypertension Mother     Thyroid disease Mother     Arthritis Mother     Heart disease Mother     Coronary artery disease Father         Cause of death    Breast cancer Sister     Cancer Sister     Vision loss Maternal Grandfather         Patient Care Team:  Omar Meneses DO as PCP - General (Family Medicine)  Aditya Anton Jr., MD, [facility] as Consulting Physician (Pulmonary Disease)  Thomas Vasquez MD as Consulting Physician (Cardiology)  Stewart Sinclair MD as Consulting Physician (Urology)  Jet Sheppard MD as Consulting Physician (Gastroenterology)  Magdaleno Garcia MD as Consulting Physician (Orthopedic Surgery)     Subjective:     Review of Systems   Constitutional:  Negative for chills and fever.   Respiratory:  Negative for shortness of breath.    Cardiovascular:  Negative for chest pain.   Gastrointestinal:  Negative for constipation and diarrhea.   Neurological:  Positive for headaches. Negative for dizziness.   Psychiatric/Behavioral:  The patient is nervous/anxious and has insomnia.      See HPI for details  All Other ROS: Negative except as stated in HPI.       Objective:     /73   Pulse 77    "Temp 97.4 °F (36.3 °C) (Tympanic)   Resp 18   Ht 5' 0.63" (1.54 m)   Wt 63 kg (139 lb)   SpO2 98%   BMI 26.59 kg/m²     Physical Exam  Vitals reviewed.   Constitutional:       General: She is not in acute distress.     Appearance: Normal appearance.   Cardiovascular:      Rate and Rhythm: Normal rate and regular rhythm.      Heart sounds: No murmur heard.    No friction rub. No gallop.   Pulmonary:      Effort: No respiratory distress.      Breath sounds: No wheezing, rhonchi or rales.   Musculoskeletal:         General: No swelling, tenderness or deformity.      Right lower leg: No edema.      Left lower leg: No edema.   Skin:     General: Skin is warm and dry.      Findings: No lesion or rash.   Neurological:      General: No focal deficit present.      Mental Status: She is alert.   Psychiatric:         Mood and Affect: Mood normal.       Assessment/Plan:     1. Generalized anxiety disorder  well controlled on current prescription medication    2. Insomnia, unspecified type  Recommended melatonin  3. Episode of recurrent major depressive disorder, unspecified depression episode severity  well controlled on current prescription medication          Follow up:     Follow up in about 3 months (around 9/12/2023) for Medicare Wellness. In addition to their scheduled follow up, the patient has also been instructed to follow up on as needed basis.         "

## 2023-08-29 DIAGNOSIS — E03.9 HYPOTHYROIDISM, UNSPECIFIED TYPE: ICD-10-CM

## 2023-08-29 RX ORDER — LEVOTHYROXINE SODIUM 50 UG/1
TABLET ORAL
Qty: 90 TABLET | Refills: 3 | Status: SHIPPED | OUTPATIENT
Start: 2023-08-29 | End: 2023-12-04 | Stop reason: SDUPTHER

## 2023-09-06 DIAGNOSIS — Z11.4 ENCOUNTER FOR SCREENING FOR HIV: ICD-10-CM

## 2023-09-06 DIAGNOSIS — Z00.00 WELLNESS EXAMINATION: Primary | ICD-10-CM

## 2023-09-06 DIAGNOSIS — Z11.59 NEED FOR HEPATITIS C SCREENING TEST: ICD-10-CM

## 2023-09-06 DIAGNOSIS — E78.2 MIXED HYPERLIPIDEMIA: ICD-10-CM

## 2023-09-06 DIAGNOSIS — E03.8 OTHER SPECIFIED HYPOTHYROIDISM: ICD-10-CM

## 2023-09-08 ENCOUNTER — LAB VISIT (OUTPATIENT)
Dept: LAB | Facility: HOSPITAL | Age: 78
End: 2023-09-08
Attending: FAMILY MEDICINE
Payer: MEDICARE

## 2023-09-08 DIAGNOSIS — Z11.59 NEED FOR HEPATITIS C SCREENING TEST: ICD-10-CM

## 2023-09-08 DIAGNOSIS — Z00.00 WELLNESS EXAMINATION: ICD-10-CM

## 2023-09-08 DIAGNOSIS — E78.2 MIXED HYPERLIPIDEMIA: ICD-10-CM

## 2023-09-08 DIAGNOSIS — E03.8 OTHER SPECIFIED HYPOTHYROIDISM: ICD-10-CM

## 2023-09-08 DIAGNOSIS — Z11.4 ENCOUNTER FOR SCREENING FOR HIV: ICD-10-CM

## 2023-09-08 LAB
ALBUMIN SERPL-MCNC: 3.8 G/DL (ref 3.4–4.8)
ALBUMIN/GLOB SERPL: 1.2 RATIO (ref 1.1–2)
ALP SERPL-CCNC: 109 UNIT/L (ref 40–150)
ALT SERPL-CCNC: 20 UNIT/L (ref 0–55)
AST SERPL-CCNC: 21 UNIT/L (ref 5–34)
BASOPHILS # BLD AUTO: 0.05 X10(3)/MCL
BASOPHILS NFR BLD AUTO: 0.7 %
BILIRUB SERPL-MCNC: 0.7 MG/DL
BUN SERPL-MCNC: 14 MG/DL (ref 9.8–20.1)
CALCIUM SERPL-MCNC: 9.9 MG/DL (ref 8.4–10.2)
CHLORIDE SERPL-SCNC: 107 MMOL/L (ref 98–107)
CHOLEST SERPL-MCNC: 234 MG/DL
CHOLEST/HDLC SERPL: 5 {RATIO} (ref 0–5)
CO2 SERPL-SCNC: 27 MMOL/L (ref 23–31)
CREAT SERPL-MCNC: 0.83 MG/DL (ref 0.55–1.02)
EOSINOPHIL # BLD AUTO: 0.13 X10(3)/MCL (ref 0–0.9)
EOSINOPHIL NFR BLD AUTO: 1.8 %
ERYTHROCYTE [DISTWIDTH] IN BLOOD BY AUTOMATED COUNT: 12.6 % (ref 11.5–17)
GFR SERPLBLD CREATININE-BSD FMLA CKD-EPI: >60 MLS/MIN/1.73/M2
GLOBULIN SER-MCNC: 3.2 GM/DL (ref 2.4–3.5)
GLUCOSE SERPL-MCNC: 99 MG/DL (ref 82–115)
HCT VFR BLD AUTO: 44 % (ref 37–47)
HCV AB SERPL QL IA: NONREACTIVE
HDLC SERPL-MCNC: 46 MG/DL (ref 35–60)
HGB BLD-MCNC: 14.5 G/DL (ref 12–16)
HIV 1+2 AB+HIV1 P24 AG SERPL QL IA: NONREACTIVE
IMM GRANULOCYTES # BLD AUTO: 0.01 X10(3)/MCL (ref 0–0.04)
IMM GRANULOCYTES NFR BLD AUTO: 0.1 %
LDLC SERPL CALC-MCNC: 155 MG/DL (ref 50–140)
LYMPHOCYTES # BLD AUTO: 3.11 X10(3)/MCL (ref 0.6–4.6)
LYMPHOCYTES NFR BLD AUTO: 42.9 %
MCH RBC QN AUTO: 30.7 PG (ref 27–31)
MCHC RBC AUTO-ENTMCNC: 33 G/DL (ref 33–36)
MCV RBC AUTO: 93 FL (ref 80–94)
MONOCYTES # BLD AUTO: 0.54 X10(3)/MCL (ref 0.1–1.3)
MONOCYTES NFR BLD AUTO: 7.4 %
NEUTROPHILS # BLD AUTO: 3.41 X10(3)/MCL (ref 2.1–9.2)
NEUTROPHILS NFR BLD AUTO: 47.1 %
NRBC BLD AUTO-RTO: 0 %
PLATELET # BLD AUTO: 323 X10(3)/MCL (ref 130–400)
PMV BLD AUTO: 9.9 FL (ref 7.4–10.4)
POTASSIUM SERPL-SCNC: 5.3 MMOL/L (ref 3.5–5.1)
PROT SERPL-MCNC: 7 GM/DL (ref 5.8–7.6)
RBC # BLD AUTO: 4.73 X10(6)/MCL (ref 4.2–5.4)
SODIUM SERPL-SCNC: 141 MMOL/L (ref 136–145)
TRIGL SERPL-MCNC: 165 MG/DL (ref 37–140)
TSH SERPL-ACNC: 1.75 UIU/ML (ref 0.35–4.94)
VLDLC SERPL CALC-MCNC: 33 MG/DL
WBC # SPEC AUTO: 7.25 X10(3)/MCL (ref 4.5–11.5)

## 2023-09-08 PROCEDURE — 85025 COMPLETE CBC W/AUTO DIFF WBC: CPT

## 2023-09-08 PROCEDURE — 84443 ASSAY THYROID STIM HORMONE: CPT

## 2023-09-08 PROCEDURE — 86803 HEPATITIS C AB TEST: CPT

## 2023-09-08 PROCEDURE — 36415 COLL VENOUS BLD VENIPUNCTURE: CPT

## 2023-09-08 PROCEDURE — 87389 HIV-1 AG W/HIV-1&-2 AB AG IA: CPT

## 2023-09-08 PROCEDURE — 80053 COMPREHEN METABOLIC PANEL: CPT

## 2023-09-08 PROCEDURE — 80061 LIPID PANEL: CPT

## 2023-09-13 ENCOUNTER — OFFICE VISIT (OUTPATIENT)
Dept: FAMILY MEDICINE | Facility: CLINIC | Age: 78
End: 2023-09-13
Payer: MEDICARE

## 2023-09-13 ENCOUNTER — DOCUMENTATION ONLY (OUTPATIENT)
Dept: FAMILY MEDICINE | Facility: CLINIC | Age: 78
End: 2023-09-13

## 2023-09-13 VITALS
WEIGHT: 140.19 LBS | SYSTOLIC BLOOD PRESSURE: 119 MMHG | OXYGEN SATURATION: 98 % | DIASTOLIC BLOOD PRESSURE: 80 MMHG | HEART RATE: 80 BPM | RESPIRATION RATE: 18 BRPM | TEMPERATURE: 99 F | BODY MASS INDEX: 26.47 KG/M2 | HEIGHT: 61 IN

## 2023-09-13 DIAGNOSIS — M85.89 OSTEOPENIA OF MULTIPLE SITES: ICD-10-CM

## 2023-09-13 DIAGNOSIS — I70.0 AORTIC ATHEROSCLEROSIS: ICD-10-CM

## 2023-09-13 DIAGNOSIS — K51.419: ICD-10-CM

## 2023-09-13 DIAGNOSIS — Z00.00 ROUTINE GENERAL MEDICAL EXAMINATION AT A HEALTH CARE FACILITY: Primary | ICD-10-CM

## 2023-09-13 PROCEDURE — G0439 PPPS, SUBSEQ VISIT: HCPCS | Mod: ,,, | Performed by: FAMILY MEDICINE

## 2023-09-13 PROCEDURE — G0439 PR MEDICARE ANNUAL WELLNESS SUBSEQUENT VISIT: ICD-10-PCS | Mod: ,,, | Performed by: FAMILY MEDICINE

## 2023-09-13 NOTE — PROGRESS NOTES
Patient ID: [id number]     Chief Complaint: Medicare AWV        HPI:     Olivia Lovell is a 78 y.o. female here today for a Medicare Wellness. Seasonal allergies with sinus headaches, has been using nasal spray and singulair with no relief of symptoms. Stopped taking her lipitor for 3 weeks a month ago to try to determine if one of her medications have been making her sweat excessively.     ----------------------------  Anxiety disorder, unspecified  Asthenia  Asthma  Carpal tunnel syndrome, bilateral  Cataract  Diverticulitis  Family history of breast cancer in first degree relative      Comment:  mother and sister  Fibromyalgia  GERD (gastroesophageal reflux disease)  Hiatal hernia  Hyperlipidemia  Hypothyroidism, unspecified  Insomnia  Major depression, recurrent  Migraines  Multiple pulmonary nodules  Osteoarthritis of both knees  Osteoporosis  PUD (peptic ulcer disease)  Sweating profusely  Thyroid disease  Verruca vulgaris     Past Surgical History:   Procedure Laterality Date    APPENDECTOMY  1952    BACK SURGERY  2011    CATARACT EXTRACTION Right 08/19/2022    Dr Quintin Mejia    CHOLECYSTECTOMY  2014    COLONOSCOPY  2016    Dr Charly Forde    ESOPHAGOGASTRODUODENOSCOPY  05/14/2018    Dr Charly Forde    ESOPHAGOGASTRODUODENOSCOPY  11/19/2015    Dr Charly Forde    NECK SURGERY  2010    ROBOT-ASSISTED NISSEN FUNDOPLICATION  12/10/2015    Dr Stewart Zhang    TONSILLECTOMY  1947    TOTAL ABDOMINAL HYSTERECTOMY  1975    TOTAL KNEE ARTHROPLASTY Bilateral 02/18/2019    Dr Mayco North       Review of patient's allergies indicates:   Allergen Reactions    Dairy-aid     Milk containing products (dairy)     Mold     Propoxyphene     Pyrethrins-piperonyl butoxide        Outpatient Medications Marked as Taking for the 9/13/23 encounter (Office Visit) with Omar Meneses,    Medication Sig Dispense Refill    albuterol (PROVENTIL/VENTOLIN HFA) 90 mcg/actuation inhaler Inhale 1 puff into the lungs every 4 (four) hours  as needed for Shortness of Breath or Wheezing.      atorvastatin (LIPITOR) 10 MG tablet Take 1 tablet (10 mg total) by mouth once daily. 30 tablet 2    calcium carbonate/vitamin D3 (VITAMIN D-3 ORAL) Take 1 tablet by mouth Daily.      cetirizine (ZYRTEC) 10 MG tablet Take 10 mg by mouth once daily.      DULoxetine (CYMBALTA) 30 MG capsule Take 1 capsule (30 mg total) by mouth once daily. 90 capsule 1    fluticasone-salmeterol diskus inhaler 250-50 mcg Inhale 1 puff into the lungs 2 (two) times daily. Controller      hydrOXYzine pamoate (VISTARIL) 25 MG Cap Take 1 capsule (25 mg total) by mouth every 8 (eight) hours as needed. 60 capsule 1    levothyroxine (SYNTHROID) 50 MCG tablet TAKE 1 TABLET BY MOUTH EVERY DAY 90 tablet 3    meclizine (ANTIVERT) 25 mg tablet Take 25 mg by mouth as needed for Dizziness.      montelukast (SINGULAIR) 10 mg tablet SMARTSI Tablet(s) By Mouth Every Evening 30 tablet 11    multivitamin capsule Take 1 capsule by mouth once daily.      omeprazole (PRILOSEC) 40 MG capsule Take 1 capsule (40 mg total) by mouth once daily. 30 capsule 5    tiotropium (SPIRIVA) 18 mcg inhalation capsule Inhale 18 mcg into the lungs once daily. Controller      triamcinolone (NASACORT) 55 mcg nasal inhaler 1 spray by Nasal route every evening. 16.9 mL 11       Social History     Socioeconomic History    Marital status:    Tobacco Use    Smoking status: Never    Smokeless tobacco: Never   Substance and Sexual Activity    Alcohol use: Never    Drug use: Never    Sexual activity: Not Currently     Birth control/protection: See Surgical Hx     Social Determinants of Health     Financial Resource Strain: Low Risk  (2023)    Overall Financial Resource Strain (CARDIA)     Difficulty of Paying Living Expenses: Not hard at all   Food Insecurity: No Food Insecurity (2023)    Hunger Vital Sign     Worried About Running Out of Food in the Last Year: Never true     Ran Out of Food in the Last Year: Never  true   Transportation Needs: No Transportation Needs (4/17/2023)    PRAPARE - Transportation     Lack of Transportation (Medical): No     Lack of Transportation (Non-Medical): No   Physical Activity: Sufficiently Active (4/17/2023)    Exercise Vital Sign     Days of Exercise per Week: 5 days     Minutes of Exercise per Session: 60 min   Stress: No Stress Concern Present (4/17/2023)    [employer] - Occupational Stress Questionnaire     Feeling of Stress : Only a little   Social Connections: Socially Integrated (4/17/2023)    Social Connection and Isolation Panel [NHANES]     Frequency of Communication with Friends and Family: More than three times a week     Frequency of Social Gatherings with Friends and Family: More than three times a week     Attends [employer] Services: 1 to 4 times per year     Active Member of Clubs or Organizations: Yes     Marital Status:    Housing Stability: Low Risk  (4/17/2023)    Housing Stability Vital Sign     Unable to Pay for Housing in the Last Year: No     Number of Places Lived in the Last Year: 1     Unstable Housing in the Last Year: No        Family History   Problem Relation Age of Onset    Breast cancer Mother         Cause of death    Diabetes Mellitus Mother     Coronary artery disease Mother     Hypertension Mother     Thyroid disease Mother     Arthritis Mother     Heart disease Mother     Coronary artery disease Father         Cause of death    Breast cancer Sister     Cancer Sister     Vision loss Maternal Grandfather         Patient Care Team:  Omar Meneses DO as PCP - General (Family Medicine)  Adity[facility] Anton Jr., [facility]ing Physician (Pulmonary Disease)  Thomas Vasquez MD as Consulting Physician (Cardiology)  Stewart Sinclair MD as Consulting Physician (Urology)  Jet Sheppard MD as Consulting Physician (Gastroenterology)  Magdaleno Garcia MD as Consulting Physician (Orthopedic Surgery)     Subjective:      Review of Systems   Constitutional:  Negative for chills and fever.   HENT:  Positive for congestion.    Respiratory:  Negative for shortness of breath.    Cardiovascular:  Negative for chest pain.   Gastrointestinal:  Negative for constipation and diarrhea.   Neurological:  Positive for dizziness and headaches.   Psychiatric/Behavioral:  The patient is nervous/anxious. The patient does not have insomnia.      Patient Reported Health Risk Assessments:  What [age] 70-79  Are you male or female?: Female  During the past four weeks, how much have you been bothered by emotional problems such as feeling anxious, depressed, irritable, sad, or downhearted and blue?: Moderately  During the past five weeks, has your physical and/or emotional health limited your social activities with family, friends, neighbors, or groups?: Not at all  During the past four weeks, how much bodily pain have you generally had?: No pain  During the past four weeks, was someone available to help if you needed and wanted help?: Yes, as much as I wanted  During the past four weeks, what was the hardest physical activity you could do for at least two minutes?: Light  Can you get to places out of walking distance without help?  (For example, can you travel alone on buses or taxis, or drive your own car?): Yes  Can you go shopping for groceries or clothes without someone's help?: Yes  Can you prepare your own meals?: Yes  Can you do your own housework without help?: Yes  Because of any health problems, do you need the help of another person with your personal care needs such as eating, bathing, dressing, or getting around the house?: No  Can you handle your own money without help?: Yes  During the past four weeks, how would you rate your health in general?: Very good  How have things been going for you during the past four weeks?: Very well  Are you having difficulties driving your car?: No  Do you always fasten your seat belt when you are  "in a car?: Yes, usually  How often in the past four weeks have you been bothered by falling or dizzy when standing up?: Never  How often in the past four weeks have you been bothered by sexual problems?: Never  How often in the past four weeks have you been bothered by trouble eating well?: Never  How often in the past four weeks have you been bothered by teeth or denture problems?: Never  How often in the past four weeks have you been bothered with problems using the telephone?: Never  How often in the past four weeks have you been bothered by tiredness or fatigue?: Sometimes  Have you fallen two or more times in the past year?: No  Are you afraid of falling?: No  Are you a smoker?: No  During the past four weeks, how many drinks of wine, beer, or other alcoholic beverages did you have?: No alcohol at all  Do you exercise for about 20 minutes three or more days a week?: Yes, most of the time  Have you been given any information to help you with hazards in your house that might hurt you?: Yes  Have you been given any information to help you with keeping track of your medications?: Yes  How often do you have trouble taking medicines the way you've been told to take them?: I always take them as prescribed  How confident are you that you can control and manage most of your health problems?: Very confident  What is your race? (Check all that apply.):     Objective:     /80   Pulse 80   Temp 98.6 °F (37 °C) (Tympanic)   Resp 18   Ht 5' 0.63" (1.54 m)   Wt 63.6 kg (140 lb 3.2 oz)   SpO2 98%   BMI 26.81 kg/m²     Physical Exam  Vitals reviewed.   Constitutional:       General: She is not in acute distress.     Appearance: Normal appearance.   Cardiovascular:      Rate and Rhythm: Normal rate and regular rhythm.      Heart sounds: No murmur heard.     No friction rub. No gallop.   Pulmonary:      Effort: No respiratory distress.      Breath sounds: No wheezing, rhonchi or rales.   Musculoskeletal:    "      General: No swelling, tenderness or deformity.      Right lower leg: No edema.      Left lower leg: No edema.   Skin:     General: Skin is warm and dry.      Findings: No lesion or rash.   Neurological:      General: No focal deficit present.      Mental Status: She is alert.   Psychiatric:         Mood and Affect: Mood normal.             Assessment:       ICD-10-CM ICD-9-CM   1. Routine general medical examination at a health care facility  Z00.00 V70.0   2. Aortic atherosclerosis  I70.0 440.0   3. Pseudopolyposis of colon with complication, unspecified part of colon  K51.419 556.4   4. Osteopenia of multiple sites  M85.89 733.90        Plan:       Medicare Annual Wellness and Personalized Prevention Plan:     Fall Risk + Home Safety + Living Situation + Whisper Test + Depression Screen + CAGE + Cognitive Impairment Screen + ADL Screen + Timed Get Up and Go + Nutrition Screen + PAQ Screen + Health Risk Assessment all reviewed.          No data to display                  9/13/2023    10:00 AM 6/12/2023     1:15 PM 4/17/2023    10:00 AM 10/20/2022    10:00 AM 10/17/2022    11:20 AM 9/22/2022    10:00 AM 9/7/2022    10:00 AM   Fall Risk Assessment - Outpatient   Mobility Status Ambulatory Ambulatory Ambulatory Ambulatory Ambulatory Ambulatory Ambulatory   Number of falls 0 0 0 0 0 0 0   Identified as fall risk False False False False False False False             Depression Screening  Over the past two weeks, has the patient felt down, depressed, or hopeless?: No  Over the past two weeks, has the patient felt little interest or pleasure in doing things?: No  Functional Ability/Safety Screening  Was the patient's timed Up & Go test unsteady or longer than 30 seconds?: No  Does the patient need help with phone, transportation, shopping, preparing meals, housework, laundry, meds, or managing money?: No  Does the patient's home have rugs in the hallway, lack grab bars in the bathroom, lack handrails on the stairs  or have poor lighting?: No  Have you noticed any hearing difficulties?: No  Cognitive Function (Assessed through direct observation with due consideration of information obtained by way of patient reports and/or concerns raised by family, friends, caretakers, or others)    Does the patient repeat questions/statements in the same day?: No  Does the patient have trouble remembering the date, year, and time?: No  Does the patient have difficulty managing finances?: No  Does the patient have a decreased sense of direction?: No      Offer of free  was accepted or rejected?: rejected  If  rejected, why?: Patient states understands English    Alcohol/Tobacco Use - Stressed importance of smoking cessation and limiting alcohol intake.  CVD Risk Factors - Reviewed  Obesity/Physical Activity - Encouraged daily 30 minute physical activity x 5 days per week.  Opioid Screening: Patient medication list reviewed, patient is not taking prescription opioids. Patient is not using additional opioids than prescribed. Patient is at low risk of substance abuse based on this opioid use history.      Health Maintenance Due   Topic Date Due    COVID-19 Vaccine (2 - Moderna series) 05/07/2021    DEXA Scan  03/11/2023    Influenza Vaccine (1) 09/01/2023    Eye Exam - Recommend annually   Dental Exam - Recommend biannually  Vaccinations -   Immunization History   Administered Date(s) Administered    COVID-19, MRNA, LN-S, PF (MODERNA FULL 0.5 ML DOSE) 03/12/2021    Influenza - High Dose - PF (65 years and older) 10/16/2016, 09/26/2017, 10/17/2022    Influenza - Quadrivalent - High Dose - PF (65 years and older) 09/25/2020, 10/10/2022    Pneumococcal Conjugate - 13 Valent 10/14/2015    Pneumococcal Polysaccharide - 23 Valent 08/01/2013    Tdap 10/14/2015    Zoster 09/04/2018    Zoster Recombinant 05/24/2018, 09/04/2018      Advance Care Planning     Date: 09/13/2023  ACP form provided for patient to complete and  return to office     1. Routine general medical examination at a health care facility  2. Aortic atherosclerosis  Taking statin daily.   3. Pseudopolyposis of colon with complication, unspecified part of colon  Followed by GI.     Medication List with Changes/Refills   Current Medications    ALBUTEROL (PROVENTIL/VENTOLIN HFA) 90 MCG/ACTUATION INHALER    Inhale 1 puff into the lungs every 4 (four) hours as needed for Shortness of Breath or Wheezing.       Start Date: 10/12/2021End Date: --    ATORVASTATIN (LIPITOR) 10 MG TABLET    Take 1 tablet (10 mg total) by mouth once daily.       Start Date: 2022 End Date: --    CALCIUM CARBONATE/VITAMIN D3 (VITAMIN D-3 ORAL)    Take 1 tablet by mouth Daily.       Start Date: --        End Date: --    CETIRIZINE (ZYRTEC) 10 MG TABLET    Take 10 mg by mouth once daily.       Start Date: --        End Date: --    DULOXETINE (CYMBALTA) 30 MG CAPSULE    Take 1 capsule (30 mg total) by mouth once daily.       Start Date: 2023 End Date: 10/14/2023    FLUTICASONE-SALMETEROL DISKUS INHALER 250-50 MCG    Inhale 1 puff into the lungs 2 (two) times daily. Controller       Start Date: --        End Date: --    HYDROXYZINE PAMOATE (VISTARIL) 25 MG CAP    Take 1 capsule (25 mg total) by mouth every 8 (eight) hours as needed.       Start Date: 2023 End Date: --    LEVOTHYROXINE (SYNTHROID) 50 MCG TABLET    TAKE 1 TABLET BY MOUTH EVERY DAY       Start Date: 2023 End Date: --    MECLIZINE (ANTIVERT) 25 MG TABLET    Take 25 mg by mouth as needed for Dizziness.       Start Date: 2022  End Date: --    MONTELUKAST (SINGULAIR) 10 MG TABLET    SMARTSI Tablet(s) By Mouth Every Evening       Start Date: 2022End Date: --    MULTIVITAMIN CAPSULE    Take 1 capsule by mouth once daily.       Start Date: --        End Date: --    OMEPRAZOLE (PRILOSEC) 40 MG CAPSULE    Take 1 capsule (40 mg total) by mouth once daily.       Start Date: 5/10/2023 End Date: --    TIOTROPIUM  (SPIRIVA) 18 MCG INHALATION CAPSULE    Inhale 18 mcg into the lungs once daily. Controller       Start Date: --        End Date: --    TRIAMCINOLONE (NASACORT) 55 MCG NASAL INHALER    1 spray by Nasal route every evening.       Start Date: 10/17/2022End Date: --      Follow up in about 6 months (around 3/13/2024). In addition to their scheduled follow up, the patient has also been instructed to follow up on as needed basis.     Provided patient with a 5-10 year written screening schedule and personal prevention plan. Recommendations were developed using the USPSTF age appropriate recommendations. Education, counseling, and referrals were provided as needed. After Visit Summary printed and given to patient which includes a list of additional screenings\tests needed.

## 2023-09-15 ENCOUNTER — HOSPITAL ENCOUNTER (OUTPATIENT)
Dept: RADIOLOGY | Facility: HOSPITAL | Age: 78
Discharge: HOME | End: 2023-09-15
Attending: INTERNAL MEDICINE
Payer: MEDICARE

## 2023-09-15 ENCOUNTER — HOSPITAL ENCOUNTER (OUTPATIENT)
Dept: RADIOLOGY | Facility: HOSPITAL | Age: 78
Discharge: HOME | End: 2023-09-15
Attending: FAMILY MEDICINE
Payer: MEDICARE

## 2023-09-15 DIAGNOSIS — J45.20 MILD INTERMITTENT ASTHMA WITHOUT COMPLICATION: ICD-10-CM

## 2023-09-15 DIAGNOSIS — M85.89 OSTEOPENIA OF MULTIPLE SITES: ICD-10-CM

## 2023-09-15 LAB — BMD RECOMMENDATION EXT: NORMAL

## 2023-09-15 PROCEDURE — 77078 CT BONE DENSITY AXIAL: CPT | Mod: TC

## 2023-09-15 PROCEDURE — 71046 X-RAY EXAM CHEST 2 VIEWS: CPT | Mod: TC

## 2023-09-16 DIAGNOSIS — M81.0 AGE-RELATED OSTEOPOROSIS WITHOUT CURRENT PATHOLOGICAL FRACTURE: Primary | ICD-10-CM

## 2023-09-16 RX ORDER — ALENDRONATE SODIUM 70 MG/1
70 TABLET ORAL
Qty: 4 TABLET | Refills: 11 | Status: SHIPPED | OUTPATIENT
Start: 2023-09-16 | End: 2024-09-15

## 2023-09-18 ENCOUNTER — DOCUMENTATION ONLY (OUTPATIENT)
Dept: FAMILY MEDICINE | Facility: CLINIC | Age: 78
End: 2023-09-18
Payer: MEDICARE

## 2023-09-18 ENCOUNTER — TELEPHONE (OUTPATIENT)
Dept: FAMILY MEDICINE | Facility: CLINIC | Age: 78
End: 2023-09-18
Payer: MEDICARE

## 2023-09-18 NOTE — TELEPHONE ENCOUNTER
----- Message from Omar Meneses DO sent at 9/16/2023  3:43 PM CDT -----  Please inform patient of lab results.    1. Osteoporosis in lumbar spine. Will send in medication for patient to start taking. She should also continue calcium and vitamin D supplementation.       Thanks,    Dr. Meneses

## 2023-09-20 ENCOUNTER — TELEPHONE (OUTPATIENT)
Dept: FAMILY MEDICINE | Facility: CLINIC | Age: 78
End: 2023-09-20
Payer: MEDICARE

## 2023-09-20 NOTE — TELEPHONE ENCOUNTER
----- Message from Farheen Stover sent at 9/20/2023  1:33 PM CDT -----  Regarding: SIDE EFFECT QUESTION  Dr. Mneeses, placed Ms. Lovell on Fosamax.  She read that people with acid reflux should use precaution when taking the medicine.  She does have a history of acid reflux and stretching of the esophagus.  Was wondering if Dr. Meneses would like for her to proceed taking the medicine.

## 2023-09-21 NOTE — TELEPHONE ENCOUNTER
Spoke with patient regarding prolia injections as a second option and she stated she had taken two injection with Dr Tena and it caused severe joint pain. She took her first dose of fosamax this morning and she stated so for she is ok. She is concerned with it causing her acid reflux to worsen and wonders if she can increase her acid reflux medication. I asked her to prescribed her reflux medication and she stated Dr Sheppard did. Instructed her to call his office and she what his recommendation would be. She verbalized understanding.

## 2023-10-14 DIAGNOSIS — F41.1 GENERALIZED ANXIETY DISORDER: ICD-10-CM

## 2023-10-16 RX ORDER — DULOXETIN HYDROCHLORIDE 30 MG/1
30 CAPSULE, DELAYED RELEASE ORAL
Qty: 90 CAPSULE | Refills: 1 | Status: SHIPPED | OUTPATIENT
Start: 2023-10-16

## 2023-11-21 ENCOUNTER — TELEPHONE (OUTPATIENT)
Dept: FAMILY MEDICINE | Facility: CLINIC | Age: 78
End: 2023-11-21
Payer: MEDICARE

## 2023-11-21 DIAGNOSIS — K21.9 GASTROESOPHAGEAL REFLUX DISEASE, UNSPECIFIED WHETHER ESOPHAGITIS PRESENT: ICD-10-CM

## 2023-11-21 RX ORDER — OMEPRAZOLE 40 MG/1
40 CAPSULE, DELAYED RELEASE ORAL DAILY
Qty: 30 CAPSULE | Refills: 5 | Status: SHIPPED | OUTPATIENT
Start: 2023-11-21

## 2023-12-04 ENCOUNTER — TELEPHONE (OUTPATIENT)
Dept: FAMILY MEDICINE | Facility: CLINIC | Age: 78
End: 2023-12-04
Payer: MEDICARE

## 2023-12-04 DIAGNOSIS — E03.9 HYPOTHYROIDISM, UNSPECIFIED TYPE: ICD-10-CM

## 2023-12-04 RX ORDER — LEVOTHYROXINE SODIUM 50 UG/1
50 TABLET ORAL DAILY
Qty: 90 TABLET | Refills: 3 | Status: SHIPPED | OUTPATIENT
Start: 2023-12-04

## 2023-12-04 NOTE — TELEPHONE ENCOUNTER
----- Message from Farida Owen sent at 12/4/2023  8:35 AM CST -----  Regarding: refill  levothyroxine (SYNTHROID) 50 MCG tablet. Mrs Baker needs a refill sent to [facility] in [place]     Thanks

## 2023-12-19 ENCOUNTER — TELEPHONE (OUTPATIENT)
Dept: FAMILY MEDICINE | Facility: CLINIC | Age: 78
End: 2023-12-19
Payer: MEDICARE

## 2023-12-19 DIAGNOSIS — E78.2 MIXED HYPERLIPIDEMIA: ICD-10-CM

## 2023-12-19 RX ORDER — ATORVASTATIN CALCIUM 10 MG/1
10 TABLET, FILM COATED ORAL DAILY
Qty: 30 TABLET | Refills: 2 | Status: SHIPPED | OUTPATIENT
Start: 2023-12-19 | End: 2024-03-27

## 2024-01-23 ENCOUNTER — OFFICE VISIT (OUTPATIENT)
Dept: URGENT CARE | Facility: CLINIC | Age: 79
End: 2024-01-23
Payer: MEDICARE

## 2024-01-23 VITALS
WEIGHT: 146 LBS | OXYGEN SATURATION: 99 % | HEIGHT: 61 IN | BODY MASS INDEX: 27.56 KG/M2 | HEART RATE: 103 BPM | SYSTOLIC BLOOD PRESSURE: 126 MMHG | TEMPERATURE: 99 F | DIASTOLIC BLOOD PRESSURE: 82 MMHG | RESPIRATION RATE: 20 BRPM

## 2024-01-23 DIAGNOSIS — J00 NASOPHARYNGITIS: Primary | ICD-10-CM

## 2024-01-23 DIAGNOSIS — R11.0 NAUSEA: ICD-10-CM

## 2024-01-23 PROCEDURE — 99213 OFFICE O/P EST LOW 20 MIN: CPT | Mod: ,,,

## 2024-01-23 RX ORDER — PREDNISONE 20 MG/1
20 TABLET ORAL DAILY
Qty: 5 TABLET | Refills: 0 | Status: SHIPPED | OUTPATIENT
Start: 2024-01-23 | End: 2024-01-28

## 2024-01-23 RX ORDER — ONDANSETRON 4 MG/1
4 TABLET, ORALLY DISINTEGRATING ORAL EVERY 8 HOURS PRN
Qty: 15 TABLET | Refills: 0 | Status: SHIPPED | OUTPATIENT
Start: 2024-01-23

## 2024-01-23 NOTE — PATIENT INSTRUCTIONS
Take antibiotic that your pulmonologist prescribed today.     Take OTC Decongestants  (Claritin D/sudafed OR Coricidin for people with HTN) to cut down on the fluid in the lining of your nose and relieve swollen nasal passages and congestion. May also use cough/cold/congestion medication such as Dayquil/Nyquil and/or antihistamine such as Claritin/Zyrtec/Allegra.  Zinc Lozenge/Cepacol sore throat lozenges/spray if needed.     Prednisone- to help with congestion/inflammation- take as prescribed- take with food.      Zofran as needed for nausea.    Drink plenty of fluids.  Rest.      Follow up with primary care in 5-6 days if not better.     Go directly to emergency room if you begin to have shortness of breath, chest pain, or other worrisome symptoms.

## 2024-01-23 NOTE — PROGRESS NOTES
"Subjective:      Patient ID: Olivia Lovell is a 78 y.o. female.    Vitals:  height is 5' 1" (1.549 m) and weight is 66.2 kg (146 lb). Her temperature is 98.6 °F (37 °C). Her blood pressure is 126/82 and her pulse is 103. Her respiration is 20 and oxygen saturation is 99%.     Chief Complaint: Sinus Problem (Pt symptoms started 3 days ago with fatigue, nausea, and sinus congestion. 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 Patient denies any fever, cough, SOB, CP, rash, n/v/d, or neck stiffness.      Upon review of chart patient called her pulmonologist today and told them symptoms, they sent in a Z-Tarun.        Constitution: Positive for fatigue.   HENT:  Positive for congestion.    Gastrointestinal:  Positive for nausea.      Objective:     Physical Exam   Constitutional: She is oriented to person, place, and time.  Non-toxic appearance. She does not appear ill.   HENT:   Ears:   Right Ear: Tympanic membrane, external ear and ear canal normal.   Left Ear: Tympanic membrane, external ear and ear canal normal.   Nose: Congestion present.   Mouth/Throat: Mucous membranes are moist. No posterior oropharyngeal erythema. Oropharynx is clear.      Comments: Clear pnd noted  Eyes: Conjunctivae are normal.   Cardiovascular: Normal rate and normal pulses.   Pulmonary/Chest: Effort normal and breath sounds normal.   Abdominal: Normal appearance and bowel sounds are normal. Soft. There is no abdominal tenderness.   Musculoskeletal: Normal range of motion.         General: Normal range of motion.   Neurological: She is alert and oriented to person, place, and time.   Skin: Skin is warm, dry and no rash. Capillary refill takes less than 2 seconds.   Psychiatric: Her behavior is normal. Mood normal.       Assessment:     1. Nasopharyngitis    2. Nausea  "       Plan:     Patient was already prescribed an antibiotic by her pulmonologist earlier today.    Nasopharyngitis  -     predniSONE (DELTASONE) 20 MG tablet; Take 1 tablet (20 mg total) by mouth once daily. for 5 days  Dispense: 5 tablet; Refill: 0    Nausea  -     ondansetron (ZOFRAN-ODT) 4 MG TbDL; Take 1 tablet (4 mg total) by mouth every 8 (eight) hours as needed (nausea).  Dispense: 15 tablet; Refill: 0        Take antibiotic that your pulmonologist prescribed today.     Take OTC Decongestants  (Claritin D/sudafed OR Coricidin for people with HTN) to cut down on the fluid in the lining of your nose and relieve swollen nasal passages and congestion. May also use cough/cold/congestion medication such as Dayquil/Nyquil and/or antihistamine such as Claritin/Zyrtec/Allegra.  Zinc Lozenge/Cepacol sore throat lozenges/spray if needed.     Prednisone- to help with congestion/inflammation- take as prescribed- take with food.      Zofran as needed for nausea.    Drink plenty of fluids.  Rest.      Follow up with primary care in 5-6 days if not better.     Go directly to emergency room if you begin to have shortness of breath, chest pain, or other worrisome symptoms.

## 2024-01-29 ENCOUNTER — OFFICE VISIT (OUTPATIENT)
Dept: URGENT CARE | Facility: CLINIC | Age: 79
End: 2024-01-29
Payer: MEDICARE

## 2024-01-29 VITALS
BODY MASS INDEX: 27.56 KG/M2 | TEMPERATURE: 97 F | OXYGEN SATURATION: 98 % | DIASTOLIC BLOOD PRESSURE: 73 MMHG | WEIGHT: 146 LBS | RESPIRATION RATE: 16 BRPM | HEIGHT: 61 IN | HEART RATE: 85 BPM | SYSTOLIC BLOOD PRESSURE: 116 MMHG

## 2024-01-29 DIAGNOSIS — R09.81 SINUS CONGESTION: Primary | ICD-10-CM

## 2024-01-29 DIAGNOSIS — R11.10 VOMITING, UNSPECIFIED VOMITING TYPE, UNSPECIFIED WHETHER NAUSEA PRESENT: ICD-10-CM

## 2024-01-29 DIAGNOSIS — R42 VERTIGO: ICD-10-CM

## 2024-01-29 LAB
CTP QC/QA: YES
CTP QC/QA: YES
POC MOLECULAR INFLUENZA A AGN: NEGATIVE
POC MOLECULAR INFLUENZA B AGN: NEGATIVE
SARS-COV-2 RDRP RESP QL NAA+PROBE: NEGATIVE

## 2024-01-29 PROCEDURE — 87502 INFLUENZA DNA AMP PROBE: CPT | Mod: QW,,, | Performed by: PHYSICIAN ASSISTANT

## 2024-01-29 PROCEDURE — 99213 OFFICE O/P EST LOW 20 MIN: CPT | Mod: 25,,, | Performed by: PHYSICIAN ASSISTANT

## 2024-01-29 PROCEDURE — 96372 THER/PROPH/DIAG INJ SC/IM: CPT | Mod: ,,, | Performed by: PHYSICIAN ASSISTANT

## 2024-01-29 PROCEDURE — 87635 SARS-COV-2 COVID-19 AMP PRB: CPT | Mod: QW,,, | Performed by: PHYSICIAN ASSISTANT

## 2024-01-29 RX ORDER — PROMETHAZINE HYDROCHLORIDE 25 MG/ML
25 INJECTION, SOLUTION INTRAMUSCULAR; INTRAVENOUS
Status: COMPLETED | OUTPATIENT
Start: 2024-01-29 | End: 2024-01-29

## 2024-01-29 RX ORDER — MECLIZINE HYDROCHLORIDE 50 MG/1
50 TABLET ORAL 2 TIMES DAILY PRN
Qty: 8 TABLET | Refills: 0 | Status: SHIPPED | OUTPATIENT
Start: 2024-01-29 | End: 2024-02-02

## 2024-01-29 RX ORDER — ONDANSETRON 8 MG/1
8 TABLET, ORALLY DISINTEGRATING ORAL 3 TIMES DAILY PRN
Qty: 6 TABLET | Refills: 0 | Status: SHIPPED | OUTPATIENT
Start: 2024-01-29 | End: 2024-01-31

## 2024-01-29 RX ORDER — AZITHROMYCIN 250 MG/1
TABLET, FILM COATED ORAL
Qty: 6 TABLET | Refills: 0 | Status: SHIPPED | OUTPATIENT
Start: 2024-01-29 | End: 2024-02-03

## 2024-01-29 RX ADMIN — PROMETHAZINE HYDROCHLORIDE 25 MG: 25 INJECTION, SOLUTION INTRAMUSCULAR; INTRAVENOUS at 01:01

## 2024-01-29 NOTE — PROGRESS NOTES
"Subjective:      Patient ID: Olivia Lovell is a 78 y.o. female.    Vitals:  height is 5' 1" (1.549 m) and weight is 66.2 kg (146 lb). Her blood pressure is 116/73 and her pulse is 85. Her respiration is 16 and oxygen saturation is 98%.     Chief Complaint: Dizziness (Pt c/o dizziness/feeling of room spinning, and bilateral ears feeling muffled. Pt states she vomited today, and maybe was running fever. Pt c/o SOB, no sore throat, and body aches. Has not eaten today. )    HPI  elderly female reports having spinning dizziness with nausea and vomiting right maxillary sinus pressure discomfort with ear fullness transported by  to urgent Care for re-evaluation after urgent care URI evaluation 1 week ago.  Patient reports not able to  or start prescription steroid for URI last week.  Patient reports similar vertigo symptoms in the past previously treated and controlled by her doctor.   Dizziness     Additional comments: Pt c/o dizziness/feeling of room spinning, and   bilateral ears feeling muffled. Pt states she vomited today, and maybe was   running fever. Pt c/o SOB, no sore throat, and body aches. Has not eaten   today.     Dizziness:   Chronicity:  New  Onset:  Today  Dizziness characteristics:  Spinning inside head only   Associated symptoms: headaches, nausea and vomiting.no ear pain, no weakness, no light-headedness, no facial weakness, no slurred speech and no chest pain.      Constitution: Negative.   HENT:  Positive for congestion, sinus pain and sinus pressure. Negative for ear pain, sore throat, trouble swallowing and voice change.    Neck: Negative for neck pain and neck swelling.   Cardiovascular:  Negative for chest pain.   Respiratory:  Negative for cough, shortness of breath, stridor and wheezing.    Gastrointestinal:  Positive for nausea and vomiting.   Skin: Negative.  Negative for erythema.   Allergic/Immunologic: Negative.    Neurological:  Positive for dizziness, history of vertigo " and headaches. Negative for light-headedness and altered mental status.   Psychiatric/Behavioral:  Negative for altered mental status.       Objective:     Physical Exam   Constitutional: She is oriented to person, place, and time. She appears well-developed. She is cooperative.  Non-toxic appearance.      Comments:Awake alert elderly female sitting in chair actively vomiting attended by      HENT:   Head: Normocephalic.   Ears:   Right Ear: Hearing, tympanic membrane, external ear and ear canal normal. No no drainage, swelling or tenderness. Tympanic membrane is not perforated, not erythematous, not retracted and not bulging.   Left Ear: Hearing, tympanic membrane, external ear and ear canal normal. No no drainage, swelling or tenderness. Tympanic membrane is not perforated, not erythematous, not retracted and not bulging.   Nose: Congestion present. No mucosal edema, rhinorrhea or nasal deformity. No epistaxis. Right sinus exhibits maxillary sinus tenderness. Right sinus exhibits no frontal sinus tenderness. Left sinus exhibits no maxillary sinus tenderness and no frontal sinus tenderness.   Mouth/Throat: Uvula is midline, oropharynx is clear and moist and mucous membranes are normal. Mucous membranes are moist. No trismus in the jaw. Normal dentition. No uvula swelling. No oropharyngeal exudate, posterior oropharyngeal edema or posterior oropharyngeal erythema.   Eyes: Conjunctivae and lids are normal. No scleral icterus.   Neck: Trachea normal and phonation normal. Neck supple. No edema present. No erythema present. No neck rigidity present.   Cardiovascular: Normal rate, regular rhythm, normal heart sounds and normal pulses.   No murmur heard.Exam reveals no gallop.   Pulmonary/Chest: Effort normal and breath sounds normal. No stridor. No respiratory distress. She has no decreased breath sounds. She has no wheezes. She has no rhonchi. She has no rales.   Abdominal: She exhibits no distension. Soft. flat  abdomen There is no abdominal tenderness. There is no guarding.   Musculoskeletal: Normal range of motion.         General: Normal range of motion.      Cervical back: She exhibits no tenderness.   Lymphadenopathy:     She has no cervical adenopathy.   Neurological: no focal deficit. She is alert and oriented to person, place, and time. She displays no weakness. No sensory deficit. She exhibits normal muscle tone. Coordination normal.   Skin: Skin is warm, dry, intact, not diaphoretic, not pale and no rash. No erythema   Psychiatric: Her speech is normal and behavior is normal. Mood, judgment and thought content normal.   Nursing note and vitals reviewed.         Previous History      Review of patient's allergies indicates:   Allergen Reactions    Dairy-aid     Milk containing products (dairy)     Mold     Propoxyphene     Pyrethrins-piperonyl butoxide        Past Medical History:   Diagnosis Date    Anxiety disorder, unspecified     Asthenia     Asthma     Carpal tunnel syndrome, bilateral     Cataract     Diverticulitis     Family history of breast cancer in first degree relative     mother and sister    Fibromyalgia     GERD (gastroesophageal reflux disease)     Hiatal hernia     Hyperlipidemia     Hypothyroidism, unspecified     Insomnia     Major depression, recurrent     Migraines     Multiple pulmonary nodules     Osteoarthritis of both knees     Osteoporosis     PUD (peptic ulcer disease)     Sweating profusely     Thyroid disease     Verruca vulgaris      Current Outpatient Medications   Medication Instructions    albuterol (PROVENTIL/VENTOLIN HFA) 90 mcg/actuation inhaler 1 puff, Inhalation, Every 4 hours PRN    alendronate (FOSAMAX) 70 mg, Oral, Every 7 days    atorvastatin (LIPITOR) 10 mg, Oral, Daily    azithromycin (Z-NORMA) 250 MG tablet Take 2 tablets by mouth on day 1; Take 1 tablet by mouth on days 2-5<BR>    calcium carbonate/vitamin D3 (VITAMIN D-3 ORAL) 1 tablet, Oral, Daily    cetirizine  (ZYRTEC) 10 mg, Oral, Daily    DULoxetine (CYMBALTA) 30 mg, Oral    fluticasone-salmeterol diskus inhaler 250-50 mcg 1 puff, Inhalation, 2 times daily, Controller    hydrOXYzine pamoate (VISTARIL) 25 mg, Oral, Every 8 hours PRN    levothyroxine (SYNTHROID) 50 mcg, Oral, Daily    meclizine (ANTIVERT) 25 mg, Oral, As needed (PRN)    meclizine (ANTIVERT) 50 mg, Oral, 2 times daily PRN    montelukast (SINGULAIR) 10 mg tablet SMARTSI Tablet(s) By Mouth Every Evening    multivitamin capsule 1 capsule, Oral, Daily    omeprazole (PRILOSEC) 40 mg, Oral, Daily    ondansetron (ZOFRAN-ODT) 4 mg, Oral, Every 8 hours PRN    ondansetron (ZOFRAN-ODT) 8 mg, Oral, 3 times daily PRN    tiotropium (SPIRIVA) 18 mcg, Inhalation, Daily, Controller    triamcinolone (NASACORT) 55 mcg nasal inhaler 1 spray, Nasal, Nightly     Past Surgical History:   Procedure Laterality Date    APPENDECTOMY  195    BACK SURGERY      CATARACT EXTRACTION Right 2022    Dr Quintin Mejia    CHOLECYSTECTOMY      COLONOSCOPY      Dr Charly Forde    ESOPHAGOGASTRODUODENOSCOPY  2018    Dr Charly Forde    ESOPHAGOGASTRODUODENOSCOPY  2015    Dr Charly Forde    NECK SURGERY      ROBOT-ASSISTED NISSEN FUNDOPLICATION  12/10/2015    Dr Stewart Zhang    TONSILLECTOMY  89    TOTAL ABDOMINAL HYSTERECTOMY  1975    TOTAL KNEE ARTHROPLASTY Bilateral 2019    Dr Mayco North     Family History   Problem Relation Age of Onset    Breast cancer Mother         Cause of death    Diabetes Mellitus Mother     Coronary artery disease Mother     Hypertension Mother     Thyroid disease Mother     Arthritis Mother     Heart disease Mother     Coronary artery disease Father         Cause of death    Breast cancer Sister     Cancer Sister     Vision loss Maternal Grandfather        Social History     Tobacco Use    Smoking status: Never     Passive exposure: Never    Smokeless tobacco: Never   Substance Use Topics    Alcohol use: Never    Drug use:  "Never        Physical Exam      Vital Signs Reviewed   /73   Pulse 85   Resp 16   Ht 5' 1" (1.549 m)   Wt 66.2 kg (146 lb)   SpO2 98%   BMI 27.59 kg/m²        Procedures    Procedures     Labs     Results for orders placed or performed in visit on 01/29/24   POCT COVID-19 Rapid Screening   Result Value Ref Range    POC Rapid COVID Negative Negative     Acceptable Yes    POCT Influenza A/B Molecular   Result Value Ref Range    POC Molecular Influenza A Ag Negative Negative, Not Reported    POC Molecular Influenza B Ag Negative Negative, Not Reported     Acceptable Yes        Assessment:     1. Sinus congestion    2. Vomiting, unspecified vomiting type, unspecified whether nausea present    3. Vertigo        Plan:   Patient A&O x4 symmetrical greatly improved with Phenergan injection in clinic no active emesis no weaknesses, no neurologic deficits.  Patient reports feeling better ready for discharge.  Discussed with  and patient negative viral testing today though high concern for recent sinus congestion URI 1 week ago seen by nurse practitioner with concern for possible sinusitis in addition to vertigo and discharge with symptomatic Rx and infection coverage with close follow-up.  Patient and  verbalized understanding satisfied ready for discharge now    Recommend meclizine medication if needed for dizziness spinning or vertigo.  Recommend Zofran if needed for nausea or vomiting.  Slow to rise and stand changing head position or turning to avoid dizziness fall or injury.  Recommend follow-up with primary care physician or ear nose and throat specialist Dr. Zavala this week for follow-up evaluation.  Recommend azithromycin antibiotic coverage for sinusitis concern.    Recommended emergency department evaluation sooner if symptoms return or worsen  Sinus congestion  -     POCT COVID-19 Rapid Screening  -     POCT Influenza A/B Molecular    Vomiting, unspecified " vomiting type, unspecified whether nausea present    Vertigo    Other orders  -     promethazine injection 25 mg  -     meclizine (ANTIVERT) 50 MG tablet; Take 1 tablet (50 mg total) by mouth 2 (two) times daily as needed for Dizziness or Nausea.  Dispense: 8 tablet; Refill: 0  -     ondansetron (ZOFRAN-ODT) 8 MG TbDL; Take 1 tablet (8 mg total) by mouth 3 (three) times daily as needed (nausea or vomiting).  Dispense: 6 tablet; Refill: 0  -     azithromycin (Z-NORMA) 250 MG tablet; Take 2 tablets by mouth on day 1; Take 1 tablet by mouth on days 2-5  Dispense: 6 tablet; Refill: 0

## 2024-01-29 NOTE — PATIENT INSTRUCTIONS
Recommend meclizine medication if needed for dizziness spinning or vertigo.  Recommend Zofran if needed for nausea or vomiting.  Slow to rise and stand changing head position or turning to avoid dizziness fall or injury.  Recommend follow-up with primary care physician or ear nose and throat specialist Dr. Zavala this week for follow-up evaluation.  Recommend azithromycin antibiotic coverage for sinusitis concern.    Recommended emergency department evaluation sooner if symptoms return or worsen

## 2024-03-13 ENCOUNTER — OFFICE VISIT (OUTPATIENT)
Dept: FAMILY MEDICINE | Facility: CLINIC | Age: 79
End: 2024-03-13
Payer: MEDICARE

## 2024-03-13 VITALS
HEIGHT: 61 IN | TEMPERATURE: 97 F | RESPIRATION RATE: 18 BRPM | DIASTOLIC BLOOD PRESSURE: 73 MMHG | OXYGEN SATURATION: 96 % | SYSTOLIC BLOOD PRESSURE: 112 MMHG | BODY MASS INDEX: 26.06 KG/M2 | HEART RATE: 88 BPM | WEIGHT: 138 LBS

## 2024-03-13 DIAGNOSIS — R42 DIZZINESS: Primary | ICD-10-CM

## 2024-03-13 DIAGNOSIS — F41.1 GENERALIZED ANXIETY DISORDER: ICD-10-CM

## 2024-03-13 PROBLEM — E78.2 MIXED HYPERLIPIDEMIA: Chronic | Status: ACTIVE | Noted: 2022-09-07

## 2024-03-13 PROCEDURE — 99214 OFFICE O/P EST MOD 30 MIN: CPT | Mod: ,,, | Performed by: FAMILY MEDICINE

## 2024-03-13 NOTE — PROGRESS NOTES
"   Patient ID: [id number]     Chief Complaint: Follow-up    HPI:     Olivia Lovell is a 78 y.o. female here today for Follow-up for chronic conditions. Has been to 6 different clinic since January for a "sinus infection." Has been on 3 rounds of antibiotics, 2 shots, steroids. 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  ENT recommended Xyzal and to continue singulair.    ----------------------------  Anxiety disorder, unspecified  Asthenia  Asthma  Carpal tunnel syndrome, bilateral  Cataract  Diverticulitis  Family history of breast cancer in first degree relative      Comment:  mother and sister  Fibromyalgia  GERD (gastroesophageal reflux disease)  Hiatal hernia  Hyperlipidemia  Hypothyroidism, unspecified  Insomnia  Major depression, recurrent  Migraines  Multiple pulmonary nodules  Osteoarthritis of both knees  Osteoporosis  PUD (peptic ulcer disease)  Sweating profusely  Thyroid disease  Verruca vulgaris     Past Surgical History:   Procedure Laterality Date    APPENDECTOMY  1952    BACK SURGERY  2011    CATARACT EXTRACTION Right 08/19/2022    Dr Quintin Mejia    CHOLECYSTECTOMY  2014    COLONOSCOPY  2016    Dr Charly Forde    ESOPHAGOGASTRODUODENOSCOPY  05/14/2018    Dr Charly Forde    ESOPHAGOGASTRODUODENOSCOPY  11/19/2015    Dr Charly Forde    NECK SURGERY  2010    ROBOT-ASSISTED NISSEN FUNDOPLICATION  12/10/2015    Dr Stewart Zhang    TONSILLECTOMY  1947    TOTAL ABDOMINAL HYSTERECTOMY  1975    TOTAL KNEE ARTHROPLASTY Bilateral 02/18/2019    Dr Mayco North       Review of patient's allergies indicates:   Allergen Reactions    Dairy-aid     Milk containing products (dairy)     Mold     Propoxyphene     Pyrethrins-piperonyl butoxide        Outpatient Medications Marked as Taking for the 3/13/24 encounter (Office Visit) with Omar Meneses,    Medication Sig Dispense Refill    albuterol (PROVENTIL/VENTOLIN HFA) 90 mcg/actuation inhaler Inhale 1 puff into the lungs " every 4 (four) hours as needed for Shortness of Breath or Wheezing.      alendronate (FOSAMAX) 70 MG tablet Take 1 tablet (70 mg total) by mouth every 7 days. 4 tablet 11    atorvastatin (LIPITOR) 10 MG tablet Take 1 tablet (10 mg total) by mouth once daily. 30 tablet 2    calcium carbonate/vitamin D3 (VITAMIN D-3 ORAL) Take 1 tablet by mouth Daily.      cetirizine (ZYRTEC) 10 MG tablet Take 10 mg by mouth once daily.      DULoxetine (CYMBALTA) 30 MG capsule TAKE 1 CAPSULE(30 MG) BY MOUTH EVERY DAY 90 capsule 1    fluticasone-salmeterol diskus inhaler 250-50 mcg Inhale 1 puff into the lungs 2 (two) times daily. Controller      hydrOXYzine pamoate (VISTARIL) 25 MG Cap Take 1 capsule (25 mg total) by mouth every 8 (eight) hours as needed. 60 capsule 1    levothyroxine (SYNTHROID) 50 MCG tablet Take 1 tablet (50 mcg total) by mouth once daily. 90 tablet 3    meclizine (ANTIVERT) 25 mg tablet Take 25 mg by mouth as needed for Dizziness.      montelukast (SINGULAIR) 10 mg tablet SMARTSI Tablet(s) By Mouth Every Evening 30 tablet 11    multivitamin capsule Take 1 capsule by mouth once daily.      omeprazole (PRILOSEC) 40 MG capsule Take 1 capsule (40 mg total) by mouth once daily. 30 capsule 5    ondansetron (ZOFRAN-ODT) 4 MG TbDL Take 1 tablet (4 mg total) by mouth every 8 (eight) hours as needed (nausea). 15 tablet 0    tiotropium (SPIRIVA) 18 mcg inhalation capsule Inhale 18 mcg into the lungs once daily. Controller      triamcinolone (NASACORT) 55 mcg nasal inhaler 1 spray by Nasal route every evening. 16.9 mL 11       Social History     Socioeconomic History    Marital status:    Tobacco Use    Smoking status: Never     Passive exposure: Never    Smokeless tobacco: Never   Substance and Sexual Activity    Alcohol use: Never    Drug use: Never    Sexual activity: Not Currently     Birth control/protection: See Surgical Hx     Social Determinants of Health     Financial Resource Strain: Low Risk  (2023)     Overall Financial Resource Strain (CARDIA)     Difficulty of Paying Living Expenses: Not hard at all   Food Insecurity: No Food Insecurity (4/17/2023)    Hunger Vital Sign     Worried About Running Out of Food in the Last Year: Never true     Ran Out of Food in the Last Year: Never true   Transportation Needs: No Transportation Needs (4/17/2023)    PRAPARE - Transportation     Lack of Transportation (Medical): No     Lack of Transportation (Non-Medical): No   Physical Activity: Sufficiently Active (4/17/2023)    Exercise Vital Sign     Days of Exercise per Week: 5 days     Minutes of Exercise per Session: 60 min   Stress: No Stress Concern Present (4/17/2023)    Citizen of [place] [employer] Occupational Health - Occupational Stress Questionnaire     Feeling of Stress : Only a little   Social Connections: Socially Integrated (4/17/2023)    Social Connection and Isolation Panel [NHANES]     Frequency of Communication with Friends and Family: More than three times a week     Frequency of Social Gatherings with Friends and Family: More than three times a week     Attends [employer] Services: 1 to 4 times per year     Active Member of Clubs or Organizations: Yes     Marital Status:    Housing Stability: Low Risk  (4/17/2023)    Housing Stability Vital Sign     Unable to Pay for Housing in the Last Year: No     Number of Places Lived in the Last Year: 1     Unstable Housing in the Last Year: No        Family History   Problem Relation Age of Onset    Breast cancer Mother         Cause of death    Diabetes Mellitus Mother     Coronary artery disease Mother     Hypertension Mother     Thyroid disease Mother     Arthritis Mother     Heart disease Mother     Coronary artery disease Father         Cause of death    Breast cancer Sister     Cancer Sister     Vision loss Maternal Grandfather         Patient Care Team:  Omar Meneses DO as PCP - General (Family Medicine)  Aditya Anton Jr., MD, FCCP as Consulting  "Physician (Pulmonary Disease)  Thomas Vasquez MD as Consulting Physician (Cardiology)  Stewart Sinclair MD as Consulting Physician (Urology)  Jet Sheppard MD as Consulting Physician (Gastroenterology)  Magdaleno Garcia MD as Consulting Physician (Orthopedic Surgery)     Subjective:     Review of Systems   Constitutional:  Negative for chills and fever.   HENT:  Positive for congestion.    Respiratory:  Negative for shortness of breath.    Cardiovascular:  Negative for chest pain.   Gastrointestinal:  Negative for constipation and diarrhea.   Neurological:  Positive for dizziness. Negative for headaches.   Psychiatric/Behavioral:  The patient does not have insomnia.        See HPI for details  All Other ROS: Negative except as stated in HPI.       Objective:     /73   Pulse 88   Temp 97.3 °F (36.3 °C) (Temporal)   Resp 18   Ht 5' 0.63" (1.54 m)   Wt 62.6 kg (138 lb)   SpO2 96%   BMI 26.39 kg/m²     Physical Exam  Vitals reviewed.   Constitutional:       General: She is not in acute distress.     Appearance: Normal appearance.   Cardiovascular:      Rate and Rhythm: Normal rate and regular rhythm.      Heart sounds: No murmur heard.     No friction rub. No gallop.   Pulmonary:      Effort: No respiratory distress.      Breath sounds: No wheezing, rhonchi or rales.   Musculoskeletal:         General: No swelling, tenderness or deformity.      Right lower leg: No edema.      Left lower leg: No edema.   Skin:     General: Skin is warm and dry.      Findings: No lesion or rash.   Neurological:      General: No focal deficit present.      Mental Status: She is alert.   Psychiatric:         Mood and Affect: Mood is anxious.         Assessment/Plan:     1. Dizziness  -Advised patient to stop Xyzal and try Claritin. Avoid decongestants. Continue singulair at night.   2. Generalized anxiety disorder  -Do not take the hydroxyzine while taking the meclizine for dizzines.   -continue duloxetine 30mg daily.    "    Follow up:     Follow up in about 6 months (around 9/13/2024) for Medicare Wellness. In addition to their scheduled follow up, the patient has also been instructed to follow up on as needed basis.

## 2024-03-27 DIAGNOSIS — E78.2 MIXED HYPERLIPIDEMIA: ICD-10-CM

## 2024-03-27 RX ORDER — ATORVASTATIN CALCIUM 10 MG/1
10 TABLET, FILM COATED ORAL
Qty: 30 TABLET | Refills: 2 | Status: SHIPPED | OUTPATIENT
Start: 2024-03-27

## 2024-05-02 DIAGNOSIS — F41.1 GENERALIZED ANXIETY DISORDER: ICD-10-CM

## 2024-05-02 RX ORDER — DULOXETIN HYDROCHLORIDE 30 MG/1
30 CAPSULE, DELAYED RELEASE ORAL
Qty: 90 CAPSULE | Refills: 1 | Status: SHIPPED | OUTPATIENT
Start: 2024-05-02

## 2024-06-13 ENCOUNTER — TELEPHONE (OUTPATIENT)
Dept: FAMILY MEDICINE | Facility: CLINIC | Age: 79
End: 2024-06-13
Payer: MEDICARE

## 2024-06-13 DIAGNOSIS — K21.9 GASTROESOPHAGEAL REFLUX DISEASE, UNSPECIFIED WHETHER ESOPHAGITIS PRESENT: ICD-10-CM

## 2024-06-13 RX ORDER — OMEPRAZOLE 40 MG/1
40 CAPSULE, DELAYED RELEASE ORAL DAILY
Qty: 30 CAPSULE | Refills: 5 | Status: SHIPPED | OUTPATIENT
Start: 2024-06-13

## 2024-06-13 NOTE — TELEPHONE ENCOUNTER
----- Message from Maya Herrera sent at 6/13/2024 11:04 AM CDT -----  Regarding: refill request  omeprazole (PRILOSEC) 40 MG capsule 30 capsule 5 11/21/2023 -  Sig: Take 1 capsule (40 mg total) by mouth once daily.

## 2024-07-24 DIAGNOSIS — E78.2 MIXED HYPERLIPIDEMIA: ICD-10-CM

## 2024-07-24 RX ORDER — ATORVASTATIN CALCIUM 10 MG/1
10 TABLET, FILM COATED ORAL DAILY
Qty: 30 TABLET | Refills: 2 | Status: SHIPPED | OUTPATIENT
Start: 2024-07-24

## 2024-08-03 DIAGNOSIS — F41.1 GENERALIZED ANXIETY DISORDER: ICD-10-CM

## 2024-08-05 RX ORDER — DULOXETIN HYDROCHLORIDE 30 MG/1
30 CAPSULE, DELAYED RELEASE ORAL
Qty: 90 CAPSULE | Refills: 1 | Status: SHIPPED | OUTPATIENT
Start: 2024-08-05

## 2024-09-12 DIAGNOSIS — Z00.00 WELLNESS EXAMINATION: Primary | ICD-10-CM

## 2024-09-12 DIAGNOSIS — E78.2 MIXED HYPERLIPIDEMIA: ICD-10-CM

## 2024-09-12 DIAGNOSIS — E03.9 HYPOTHYROIDISM, UNSPECIFIED TYPE: ICD-10-CM

## 2024-09-13 ENCOUNTER — LAB VISIT (OUTPATIENT)
Dept: LAB | Facility: HOSPITAL | Age: 79
End: 2024-09-13
Attending: FAMILY MEDICINE
Payer: MEDICARE

## 2024-09-13 DIAGNOSIS — E78.2 MIXED HYPERLIPIDEMIA: ICD-10-CM

## 2024-09-13 DIAGNOSIS — E03.9 HYPOTHYROIDISM, UNSPECIFIED TYPE: ICD-10-CM

## 2024-09-13 DIAGNOSIS — R73.01 ELEVATED FASTING GLUCOSE: ICD-10-CM

## 2024-09-13 DIAGNOSIS — Z00.00 WELLNESS EXAMINATION: ICD-10-CM

## 2024-09-13 LAB
ALBUMIN SERPL-MCNC: 4 G/DL (ref 3.4–4.8)
ALBUMIN/GLOB SERPL: 1.1 RATIO (ref 1.1–2)
ALP SERPL-CCNC: 94 UNIT/L (ref 40–150)
ALT SERPL-CCNC: 19 UNIT/L (ref 0–55)
ANION GAP SERPL CALC-SCNC: 11 MEQ/L
AST SERPL-CCNC: 19 UNIT/L (ref 5–34)
BASOPHILS # BLD AUTO: 0.05 X10(3)/MCL
BASOPHILS NFR BLD AUTO: 0.6 %
BILIRUB SERPL-MCNC: 0.7 MG/DL
BUN SERPL-MCNC: 12 MG/DL (ref 9.8–20.1)
CALCIUM SERPL-MCNC: 10.2 MG/DL (ref 8.4–10.2)
CHLORIDE SERPL-SCNC: 105 MMOL/L (ref 98–107)
CHOLEST SERPL-MCNC: 175 MG/DL
CHOLEST/HDLC SERPL: 3 {RATIO} (ref 0–5)
CO2 SERPL-SCNC: 26 MMOL/L (ref 23–31)
CREAT SERPL-MCNC: 0.95 MG/DL (ref 0.55–1.02)
CREAT/UREA NIT SERPL: 13
EOSINOPHIL # BLD AUTO: 0.15 X10(3)/MCL (ref 0–0.9)
EOSINOPHIL NFR BLD AUTO: 1.9 %
ERYTHROCYTE [DISTWIDTH] IN BLOOD BY AUTOMATED COUNT: 12.6 % (ref 11.5–17)
GFR SERPLBLD CREATININE-BSD FMLA CKD-EPI: >60 ML/MIN/1.73/M2
GLOBULIN SER-MCNC: 3.5 GM/DL (ref 2.4–3.5)
GLUCOSE SERPL-MCNC: 119 MG/DL (ref 82–115)
HCT VFR BLD AUTO: 46.1 % (ref 37–47)
HDLC SERPL-MCNC: 55 MG/DL (ref 35–60)
HGB BLD-MCNC: 15.1 G/DL (ref 12–16)
IMM GRANULOCYTES # BLD AUTO: 0.02 X10(3)/MCL (ref 0–0.04)
IMM GRANULOCYTES NFR BLD AUTO: 0.2 %
LDLC SERPL CALC-MCNC: 87 MG/DL (ref 50–140)
LYMPHOCYTES # BLD AUTO: 3.5 X10(3)/MCL (ref 0.6–4.6)
LYMPHOCYTES NFR BLD AUTO: 43.6 %
MCH RBC QN AUTO: 30.6 PG (ref 27–31)
MCHC RBC AUTO-ENTMCNC: 32.8 G/DL (ref 33–36)
MCV RBC AUTO: 93.3 FL (ref 80–94)
MONOCYTES # BLD AUTO: 0.54 X10(3)/MCL (ref 0.1–1.3)
MONOCYTES NFR BLD AUTO: 6.7 %
NEUTROPHILS # BLD AUTO: 3.77 X10(3)/MCL (ref 2.1–9.2)
NEUTROPHILS NFR BLD AUTO: 47 %
NRBC BLD AUTO-RTO: 0 %
PLATELET # BLD AUTO: 323 X10(3)/MCL (ref 130–400)
PMV BLD AUTO: 9.5 FL (ref 7.4–10.4)
POTASSIUM SERPL-SCNC: 4.7 MMOL/L (ref 3.5–5.1)
PROT SERPL-MCNC: 7.5 GM/DL (ref 5.8–7.6)
RBC # BLD AUTO: 4.94 X10(6)/MCL (ref 4.2–5.4)
SODIUM SERPL-SCNC: 142 MMOL/L (ref 136–145)
TRIGL SERPL-MCNC: 164 MG/DL (ref 37–140)
TSH SERPL-ACNC: 1.6 UIU/ML (ref 0.35–4.94)
VLDLC SERPL CALC-MCNC: 33 MG/DL
WBC # BLD AUTO: 8.03 X10(3)/MCL (ref 4.5–11.5)

## 2024-09-13 PROCEDURE — 85025 COMPLETE CBC W/AUTO DIFF WBC: CPT

## 2024-09-13 PROCEDURE — 84443 ASSAY THYROID STIM HORMONE: CPT

## 2024-09-13 PROCEDURE — 36415 COLL VENOUS BLD VENIPUNCTURE: CPT

## 2024-09-13 PROCEDURE — 83036 HEMOGLOBIN GLYCOSYLATED A1C: CPT

## 2024-09-13 PROCEDURE — 80053 COMPREHEN METABOLIC PANEL: CPT

## 2024-09-13 PROCEDURE — 80061 LIPID PANEL: CPT

## 2024-09-18 ENCOUNTER — TELEPHONE (OUTPATIENT)
Dept: FAMILY MEDICINE | Facility: CLINIC | Age: 79
End: 2024-09-18

## 2024-09-18 ENCOUNTER — OFFICE VISIT (OUTPATIENT)
Dept: FAMILY MEDICINE | Facility: CLINIC | Age: 79
End: 2024-09-18
Payer: MEDICARE

## 2024-09-18 VITALS
BODY MASS INDEX: 26.31 KG/M2 | RESPIRATION RATE: 20 BRPM | SYSTOLIC BLOOD PRESSURE: 124 MMHG | OXYGEN SATURATION: 98 % | TEMPERATURE: 98 F | HEART RATE: 98 BPM | WEIGHT: 139.38 LBS | DIASTOLIC BLOOD PRESSURE: 71 MMHG | HEIGHT: 61 IN

## 2024-09-18 DIAGNOSIS — Z00.00 MEDICARE ANNUAL WELLNESS VISIT, SUBSEQUENT: Primary | ICD-10-CM

## 2024-09-18 DIAGNOSIS — F33.9 EPISODE OF RECURRENT MAJOR DEPRESSIVE DISORDER, UNSPECIFIED DEPRESSION EPISODE SEVERITY: ICD-10-CM

## 2024-09-18 DIAGNOSIS — R73.01 ELEVATED FASTING GLUCOSE: ICD-10-CM

## 2024-09-18 DIAGNOSIS — Z00.00 ENCOUNTER FOR PREVENTIVE HEALTH EXAMINATION: ICD-10-CM

## 2024-09-18 DIAGNOSIS — I70.0 AORTIC ATHEROSCLEROSIS: ICD-10-CM

## 2024-09-18 DIAGNOSIS — E78.2 MIXED HYPERLIPIDEMIA: ICD-10-CM

## 2024-09-18 DIAGNOSIS — F41.1 GENERALIZED ANXIETY DISORDER: ICD-10-CM

## 2024-09-18 DIAGNOSIS — J30.1 SEASONAL ALLERGIC RHINITIS DUE TO POLLEN: ICD-10-CM

## 2024-09-18 DIAGNOSIS — M81.0 AGE-RELATED OSTEOPOROSIS WITHOUT CURRENT PATHOLOGICAL FRACTURE: ICD-10-CM

## 2024-09-18 DIAGNOSIS — E03.9 HYPOTHYROIDISM, UNSPECIFIED TYPE: ICD-10-CM

## 2024-09-18 DIAGNOSIS — K51.40 PSEUDOPOLYPOSIS OF COLON, UNSPECIFIED COMPLICATION STATUS, UNSPECIFIED PART OF COLON: ICD-10-CM

## 2024-09-18 PROBLEM — R73.03 PREDIABETES: Status: ACTIVE | Noted: 2024-09-18

## 2024-09-18 PROBLEM — Z12.39 SCREENING FOR BREAST CANCER: Status: RESOLVED | Noted: 2022-09-07 | Resolved: 2024-09-18

## 2024-09-18 LAB
EST. AVERAGE GLUCOSE BLD GHB EST-MCNC: 119.8 MG/DL
HBA1C MFR BLD: 5.8 %

## 2024-09-18 PROCEDURE — G0439 PPPS, SUBSEQ VISIT: HCPCS | Mod: ,,,

## 2024-09-18 RX ORDER — ATORVASTATIN CALCIUM 10 MG/1
10 TABLET, FILM COATED ORAL DAILY
Qty: 30 TABLET | Refills: 11 | Status: SHIPPED | OUTPATIENT
Start: 2024-09-18 | End: 2025-09-18

## 2024-09-18 RX ORDER — METHYLPREDNISOLONE 4 MG/1
TABLET ORAL
Qty: 1 EACH | Refills: 0 | Status: SHIPPED | OUTPATIENT
Start: 2024-09-18

## 2024-09-18 NOTE — ASSESSMENT & PLAN NOTE
Lab Results   Component Value Date    LDL 87.00 09/13/2024    TRIG 164 (H) 09/13/2024    HDL 55 09/13/2024    TOTALCHOLEST 3 09/13/2024     Continue atorvastatin 10 mg daily.  Educated patient that goal for triglycerides < 150.   Follow a low cholesterol, low saturated fat diet with less that 200mg of cholesterol a day.  Avoid fried foods and high saturated fats (high saturated fats less than 7% of calories).  Add Flax Seed/Fish Oil supplements to diet. Increase dietary fiber.  Regular exercise can reduce LDL and raise HDL. Stressed importance of physical activity 5 times per week for 30 minutes per day.

## 2024-09-18 NOTE — ASSESSMENT & PLAN NOTE
Well controlled.    Continue Cymbalta 30 mg daily.  Exercise daily. Get sunlight daily.  Practice positive phrases and repeat throughout the day, along with yoga and relaxation techniques.  Establish good social support, make changes to reduce stress.  Reports any symptoms of suicidal/homicidal ideations or self harm immediately, if clinic is closed go to nearest emergency room.

## 2024-09-18 NOTE — ASSESSMENT & PLAN NOTE
Continue Caltrate supplementation.   Continue Fosamax 70 mg every 7 days.   Weight bearing exercise such as walking or resistance training to build bones 5 times a week.  Avoid soda and excessive alcohol.  Avoid falls by assessing home for loose cords and rugs, wearing proper footwear, and using proper lighting in rooms.  Stressed importance of Smoking Cessation as smoking speeds bone loss.   Repeat DEXA in  2025.

## 2024-09-18 NOTE — ASSESSMENT & PLAN NOTE
Continue Vistaril 25 mg every 8 hours as needed.    Practice deep breathing or abdominal breathing exercises when anxiety occurs.  Exercise daily. Get sunlight daily.  Avoid caffeine, alcohol and stimulants.  Practice positive phrases and repeat throughout the day, along with yoga and relaxation techniques.  Set healthy boundaries, avoid people and conversations that increase stress.  Reports any symptoms of suicidal or homicidal ideations immediately, if clinic is closed go to nearest emergency room.

## 2024-09-18 NOTE — ASSESSMENT & PLAN NOTE
Managed by Dr. Sheppard.   Patient denies any diarrhea, bloating, rectal bleeding or abdominal pain.

## 2024-09-18 NOTE — PATIENT INSTRUCTIONS
Medicare Annual Wellness Visit      Patient Name: Olivia Lovell  Today's Date: 9/18/2024    Below you will find your 5-10 year Screening Plan Recommendations:  Health Maintenance         Date Due Completion Date    RSV Vaccine (Age 60+ and Pregnant patients) (1 - 1-dose 60+ series) Never done ---    Influenza Vaccine (1) 09/01/2024 9/15/2023    COVID-19 Vaccine (2 - 2023-24 season) 09/01/2024 3/12/2021    DEXA Scan 09/15/2025 9/15/2023    TETANUS VACCINE 10/14/2025 10/14/2015    Lipid Panel 09/13/2029 9/13/2024            Below is your summarized Personalized Prevention Plan that addresses any concerns we discussed today at your visit. Please see attached detailed information specific to your Health Concerns.  Orders Placed This Encounter   Procedures    Hemoglobin A1C         The following information is provided to all patients.  This information is to help you find additional resources for any problems that may be affecting your health: Living healthy guide: www.[facility].[place]      Understanding Diabetes: www.diabetes.org      Eating healthy: www.cdc.gov/healthyweight      CDC home safety checklist: www.cdc.gov/steadi/patient.html      Agency on Aging: www.goea.[place].[facility]      Alcoholics anonymous (AA): www.aa.org      Physical Activity: www.teddy.nih.gov/nv6zwdr      Tobacco use: www.quitwithusla.org

## 2024-09-18 NOTE — ASSESSMENT & PLAN NOTE
Lab Results   Component Value Date    TSH 1.604 09/13/2024    [id number] 0.82 06/15/2020      Well controlled.    Continue Levothyroxine 50 mcg daily.   Take medicine on an empty stomach with water (no other medications or beverages). Wait 30 minutes to eat or drink.  Report any symptoms of thinning hair, breaking nails, fatigue, weight gain or loss, palpitations.

## 2024-09-18 NOTE — TELEPHONE ENCOUNTER
----- Message from Shahida Fuchs NP sent at 9/18/2024  3:42 PM CDT -----  Please inform patient of lab results.     1. A1C is 5.8. Patient is prediabetic.       Follow ADA Diet. Avoid soda, simple sweets, and limit rice/pasta/breads/starches (no more than 45-50 grams per meal).  Maintain healthy weight with goal BMI <30.  Exercise 5 times per week for 30 minutes per day.      Thanks for all you do,   Shahida

## 2024-09-18 NOTE — PROGRESS NOTES
"       Internal Medicine    Olivia Lovell is a 79 y.o. female here today for a Medicare Annual Wellness visit and comprehensive Health Risk Assessment.     Subjective   The following components were reviewed and updated:  Medical history  Family History  Social history  Allergies  Current Medications  Immunizations  Health Maintenance  Patient Care Team    Review of Systems  A comprehensive review of systems was conducted and is negative except as noted above.     Objective   Visit Vitals  /71 (BP Location: Right arm, Patient Position: Sitting, BP Method: Large (Automatic))   Pulse 98   Temp 97.8 °F (36.6 °C)   Resp 20   Ht 5' 1" (1.549 m)   Wt 63.2 kg (139 lb 6.4 oz)   SpO2 98%   BMI 26.34 kg/m²        Physical Exam  Vitals and nursing note reviewed.   Constitutional:       General: She is not in acute distress.     Appearance: She is not ill-appearing.   HENT:      Head: Normocephalic and atraumatic.      Nose: Congestion present.      Right Turbinates: Enlarged.      Left Turbinates: Enlarged.      Mouth/Throat:      Mouth: Mucous membranes are moist.      Pharynx: Oropharynx is clear.   Eyes:      General: No scleral icterus.     Extraocular Movements: Extraocular movements intact.      Conjunctiva/sclera: Conjunctivae normal.      Pupils: Pupils are equal, round, and reactive to light.   Neck:      Vascular: No carotid bruit.   Cardiovascular:      Rate and Rhythm: Normal rate and regular rhythm.      Heart sounds: No murmur heard.     No friction rub. No gallop.   Pulmonary:      Effort: Pulmonary effort is normal. No respiratory distress.      Breath sounds: Normal breath sounds. No wheezing, rhonchi or rales.   Abdominal:      General: Abdomen is flat. Bowel sounds are normal. There is no distension.      Palpations: Abdomen is soft. There is no mass.      Tenderness: There is no abdominal tenderness.   Musculoskeletal:         General: Normal range of motion.      Cervical back: Normal range of " motion and neck supple.   Skin:     General: Skin is warm and dry.   Neurological:      General: No focal deficit present.      Mental Status: She is alert.   Psychiatric:         Mood and Affect: Mood normal.          Assessment/Plan:  1. Medicare annual wellness visit, subsequent    2. Encounter for preventive health examination    3. Elevated fasting glucose  -     Hemoglobin A1C; Future; Expected date: 09/18/2024    4. Episode of recurrent major depressive disorder, unspecified depression episode severity  Assessment & Plan:  Well controlled.    Continue Cymbalta 30 mg daily.  Exercise daily. Get sunlight daily.  Practice positive phrases and repeat throughout the day, along with yoga and relaxation techniques.  Establish good social support, make changes to reduce stress.  Reports any symptoms of suicidal/homicidal ideations or self harm immediately, if clinic is closed go to nearest emergency room.      5. Aortic atherosclerosis  Assessment & Plan:  Continue atorvastatin 10 mg daily.      6. Hypothyroidism, unspecified type  Assessment & Plan:  Lab Results   Component Value Date    TSH 1.604 09/13/2024    [id number] 0.82 06/15/2020      Well controlled.    Continue Levothyroxine 50 mcg daily.   Take medicine on an empty stomach with water (no other medications or beverages). Wait 30 minutes to eat or drink.  Report any symptoms of thinning hair, breaking nails, fatigue, weight gain or loss, palpitations.         7. Generalized anxiety disorder  Assessment & Plan:  Continue Vistaril 25 mg every 8 hours as needed.    Practice deep breathing or abdominal breathing exercises when anxiety occurs.  Exercise daily. Get sunlight daily.  Avoid caffeine, alcohol and stimulants.  Practice positive phrases and repeat throughout the day, along with yoga and relaxation techniques.  Set healthy boundaries, avoid people and conversations that increase stress.  Reports any symptoms of suicidal or homicidal ideations  immediately, if clinic is closed go to nearest emergency room.      8. Mixed hyperlipidemia  Assessment & Plan:  Lab Results   Component Value Date    LDL 87.00 09/13/2024    TRIG 164 (H) 09/13/2024    HDL 55 09/13/2024    TOTALCHOLEST 3 09/13/2024     Continue atorvastatin 10 mg daily.  Educated patient that goal for triglycerides < 150.   Follow a low cholesterol, low saturated fat diet with less that 200mg of cholesterol a day.  Avoid fried foods and high saturated fats (high saturated fats less than 7% of calories).  Add Flax Seed/Fish Oil supplements to diet. Increase dietary fiber.  Regular exercise can reduce LDL and raise HDL. Stressed importance of physical activity 5 times per week for 30 minutes per day.     Orders:  -     atorvastatin (LIPITOR) 10 MG tablet; Take 1 tablet (10 mg total) by mouth once daily.  Dispense: 30 tablet; Refill: 11    9. Seasonal allergic rhinitis due to pollen  Assessment & Plan:  Recommended the patient alternate her antihistamine.    Medrol Dosepak ordered.   Avoid/limit triggers such as pollen, dust, molds and animal dander.  Avoid smoke, strong chemicals, and strong cleaning products in addition to strong perfumes/colognes.  Use rescue inhaler when needed, use nebulizer for wheezing or URI.      Orders:  -     methylPREDNISolone (MEDROL DOSEPACK) 4 mg tablet; use as directed  Dispense: 1 each; Refill: 0    10. Pseudopolyposis of colon, unspecified complication status, unspecified part of colon  Assessment & Plan:  Managed by Dr. Sheppard.   Patient denies any diarrhea, bloating, rectal bleeding or abdominal pain.        11. Age-related osteoporosis without current pathological fracture  Assessment & Plan:  Continue Caltrate supplementation.   Continue Fosamax 70 mg every 7 days.   Weight bearing exercise such as walking or resistance training to build bones 5 times a week.  Avoid soda and excessive alcohol.  Avoid falls by assessing home for loose cords and rugs, wearing proper  footwear, and using proper lighting in rooms.  Stressed importance of Smoking Cessation as smoking speeds bone loss.   Repeat DEXA in  2025.         A comprehensive HEALTH RISK ASSESSMENT was completed today. Results are summarized below:    There are NO EMOTIONAL/SOCIAL CONCERNS identified on today's screening for Social Isolation, Depression and Anxiety.    There are NO COGNITIVE FUNCTION CONCERNS identified on today's screening.    There are NO FUNCTIONAL OR SAFETY CONCERNS were identified on today's screening for Physical Symptoms, Nutritional, Cognitive Function, Home Safety/Living Situation, Fall Risk, Activities of Daily Living, Independent Activities of Daily Living, Physical Activity, Timed Up and Go test and Whisper test.     The patient reports NO OPIOID PRESCRIPTIONS. This was confirmed through medication reconciliation and the [facility] website.    The patient is NOT A TOBACCO USER.        All Questions regarding food, transportation or housing were not answered today.      I provided Olivia GIRISH StroudWarren with a 5-10 year written Screening Schedule per USPSTF age appropriate recommendations and a Personal Prevention Plan based on the results of today's Health Risk Assessment. Education, counseling, and referrals were provided as documented above and can be viewed in the After Visit Summary.    Follow up in about 6 months (around 3/18/2025) for Follow Up. In addition to this scheduled follow up, the patient has also been instructed to follow up on as needed basis.     none  The patient was asked and declined the use of a free .  Advance Care Planning   Today we discussed advance care planning. She is interested in learning more about how to make Advance Directives. Information was provided and I offered to discuss more at her discretion.

## 2024-10-04 DIAGNOSIS — E03.9 HYPOTHYROIDISM, UNSPECIFIED TYPE: ICD-10-CM

## 2024-10-04 RX ORDER — LEVOTHYROXINE SODIUM 50 UG/1
50 TABLET ORAL
Qty: 90 TABLET | Refills: 3 | Status: SHIPPED | OUTPATIENT
Start: 2024-10-04

## 2024-10-18 ENCOUNTER — HOSPITAL ENCOUNTER (OUTPATIENT)
Dept: RADIOLOGY | Facility: HOSPITAL | Age: 79
Discharge: HOME | End: 2024-10-18
Attending: INTERNAL MEDICINE
Payer: MEDICARE

## 2024-10-18 DIAGNOSIS — J45.20 MILD INTERMITTENT ASTHMA IN ADULT WITHOUT COMPLICATION: ICD-10-CM

## 2024-10-18 PROCEDURE — 71046 X-RAY EXAM CHEST 2 VIEWS: CPT | Mod: TC

## 2024-11-04 ENCOUNTER — HOSPITAL ENCOUNTER (OUTPATIENT)
Dept: RADIOLOGY | Facility: HOSPITAL | Age: 79
Discharge: HOME | End: 2024-11-04
Attending: INTERNAL MEDICINE
Payer: MEDICARE

## 2024-11-04 DIAGNOSIS — J32.8 OTHER CHRONIC SINUSITIS: ICD-10-CM

## 2024-11-04 PROCEDURE — 70486 CT MAXILLOFACIAL W/O DYE: CPT | Mod: TC

## 2024-11-05 ENCOUNTER — TELEPHONE (OUTPATIENT)
Dept: FAMILY MEDICINE | Facility: CLINIC | Age: 79
End: 2024-11-05
Payer: MEDICARE

## 2024-11-05 DIAGNOSIS — F41.1 GENERALIZED ANXIETY DISORDER: ICD-10-CM

## 2024-11-05 RX ORDER — DULOXETIN HYDROCHLORIDE 30 MG/1
30 CAPSULE, DELAYED RELEASE ORAL DAILY
Qty: 90 CAPSULE | Refills: 1 | Status: SHIPPED | OUTPATIENT
Start: 2024-11-05

## 2024-11-20 ENCOUNTER — HOSPITAL ENCOUNTER (OUTPATIENT)
Dept: RADIOLOGY | Facility: HOSPITAL | Age: 79
Discharge: HOME | End: 2024-11-20
Attending: INTERNAL MEDICINE
Payer: MEDICARE

## 2024-11-20 DIAGNOSIS — R06.09 DYSPNEA ON EXERTION: ICD-10-CM

## 2024-11-20 LAB
AORTIC ROOT ANNULUS: 2.84 CM
AORTIC VALVE CUSP SEPERATION: 0.91 CM
AV PEAK GRADIENT: 4.8 MMHG
CV ECHO LV RWT: 0.5 CM
DOP CALC AO PEAK VEL: 1.1 M/S
E WAVE DECELERATION TIME: 143.02 MSEC
E/A RATIO: 0.72
ECHO LV POSTERIOR WALL: 1 CM (ref 0.6–1.1)
FRACTIONAL SHORTENING: 30 % (ref 28–44)
INTERVENTRICULAR SEPTUM: 1 CM (ref 0.6–1.1)
LEFT INTERNAL DIMENSION IN SYSTOLE: 2.8 CM (ref 2.1–4)
LEFT VENTRICLE DIASTOLIC VOLUME: 71.13 ML
LEFT VENTRICLE SYSTOLIC VOLUME: 28.93 ML
LEFT VENTRICULAR INTERNAL DIMENSION IN DIASTOLE: 4 CM (ref 3.5–6)
LEFT VENTRICULAR MASS: 127.1 G
LVED V (TEICH): 71.13 ML
LVES V (TEICH): 28.93 ML
MV PEAK A VEL: 0.99 M/S
MV PEAK E VEL: 0.71 M/S
MV STENOSIS PRESSURE HALF TIME: 41.48 MS
MV VALVE AREA P 1/2 METHOD: 5.3 CM2
OHS LV EJECTION FRACTION SIMPSONS BIPLANE MOD: 56 %
PISA TR MAX VEL: 2 M/S
PV PEAK GRADIENT: 3 MMHG
PV PEAK VELOCITY: 0.9 M/S
RA PRESSURE ESTIMATED: 3 MMHG
RIGHT VENTRICULAR END-DIASTOLIC DIMENSION: 2.69 CM
RV TB RVSP: 5 MMHG
TR MAX PG: 16 MMHG
TRICUSPID VALVE PEAK A WAVE VELOCITY: 0.44 M/S
TV PEAK E VEL: 0.5 M/S
TV REST PULMONARY ARTERY PRESSURE: 19 MMHG
TV STENOSIS PRESSURE HALF TIME: 38.62 MS
TV VALVE AREA P 1/2 METHOD: 4.92 CM2

## 2024-11-20 PROCEDURE — 93306 TTE W/DOPPLER COMPLETE: CPT

## 2024-12-07 DIAGNOSIS — K21.9 GASTROESOPHAGEAL REFLUX DISEASE, UNSPECIFIED WHETHER ESOPHAGITIS PRESENT: ICD-10-CM

## 2024-12-09 RX ORDER — OMEPRAZOLE 40 MG/1
CAPSULE, DELAYED RELEASE ORAL
Qty: 30 CAPSULE | Refills: 5 | Status: SHIPPED | OUTPATIENT
Start: 2024-12-09

## 2025-01-08 ENCOUNTER — TELEPHONE (OUTPATIENT)
Dept: FAMILY MEDICINE | Facility: CLINIC | Age: 80
End: 2025-01-08
Payer: MEDICARE

## 2025-01-08 DIAGNOSIS — E03.9 HYPOTHYROIDISM, UNSPECIFIED TYPE: ICD-10-CM

## 2025-01-08 RX ORDER — LEVOTHYROXINE SODIUM 50 UG/1
50 TABLET ORAL
Qty: 90 TABLET | Refills: 3 | Status: SHIPPED | OUTPATIENT
Start: 2025-01-08

## 2025-03-20 ENCOUNTER — OFFICE VISIT (OUTPATIENT)
Dept: FAMILY MEDICINE | Facility: CLINIC | Age: 80
End: 2025-03-20
Payer: MEDICARE

## 2025-03-20 VITALS
DIASTOLIC BLOOD PRESSURE: 72 MMHG | BODY MASS INDEX: 26.77 KG/M2 | SYSTOLIC BLOOD PRESSURE: 113 MMHG | TEMPERATURE: 98 F | RESPIRATION RATE: 18 BRPM | WEIGHT: 141.81 LBS | HEART RATE: 70 BPM | HEIGHT: 61 IN | OXYGEN SATURATION: 99 %

## 2025-03-20 DIAGNOSIS — K51.40 PSEUDOPOLYPOSIS OF COLON, UNSPECIFIED COMPLICATION STATUS, UNSPECIFIED PART OF COLON: ICD-10-CM

## 2025-03-20 DIAGNOSIS — F33.9 EPISODE OF RECURRENT MAJOR DEPRESSIVE DISORDER, UNSPECIFIED DEPRESSION EPISODE SEVERITY: ICD-10-CM

## 2025-03-20 DIAGNOSIS — M19.90 ARTHRITIS: ICD-10-CM

## 2025-03-20 DIAGNOSIS — I70.0 AORTIC ATHEROSCLEROSIS: ICD-10-CM

## 2025-03-20 DIAGNOSIS — H34.8122 CENTRAL RETINAL VEIN OCCLUSION, LEFT EYE, STABLE: Primary | ICD-10-CM

## 2025-03-20 DIAGNOSIS — I25.118 ATHEROSCLEROTIC HEART DISEASE OF NATIVE CORONARY ARTERY WITH OTHER FORMS OF ANGINA PECTORIS: ICD-10-CM

## 2025-03-20 NOTE — PROGRESS NOTES
Patient ID: [id number]     Chief Complaint: Follow-up (6 month up)    HPI:     Olivia Lovell is a 79 y.o. female here today for Follow-up (6 month up). Recently diagnosed with Meniere's disease by ENT.     Arthritis has been worsening in multiple joints.     History of Present Illness               -------------------------------------    Anxiety disorder, unspecified    Asthenia    Asthma    Carpal tunnel syndrome, bilateral    Cataract    Diverticulitis    Family history of breast cancer in first degree relative    mother and sister    Fibromyalgia    GERD (gastroesophageal reflux disease)    Hiatal hernia    Hyperlipidemia    Hypothyroidism, unspecified    Insomnia    Major depression, recurrent    Meniere's disease, unspecified ear    Migraines    Multiple pulmonary nodules    Osteoarthritis of both knees    Osteoporosis    PUD (peptic ulcer disease)    Sweating profusely    Thyroid disease    Verruca vulgaris        Past Surgical History:   Procedure Laterality Date    APPENDECTOMY  1952    BACK SURGERY  2011    CATARACT EXTRACTION Right 08/19/2022    Dr Quintin Mejia    CHOLECYSTECTOMY  2014    COLONOSCOPY  2016    Dr Charly Forde    ESOPHAGOGASTRODUODENOSCOPY  05/14/2018    Dr Charly Forde    ESOPHAGOGASTRODUODENOSCOPY  11/19/2015    Dr Charly Forde    EYE SURGERY  08/19/2022    Cataract    HERNIA REPAIR      JOINT REPLACEMENT      NECK SURGERY  2010    ROBOT-ASSISTED NISSEN FUNDOPLICATION  12/10/2015    Dr Stewart Zhang    SPINE SURGERY      TONSILLECTOMY  1947    TONSILLECTOMY      TOTAL ABDOMINAL HYSTERECTOMY  1975    TOTAL KNEE ARTHROPLASTY Bilateral 02/18/2019    Dr Mayco North       Review of patient's allergies indicates:   Allergen Reactions    Dairy-aid     Milk containing products (dairy)     Mold     Propoxyphene     Pyrethrins-piperonyl butoxide        Outpatient Medications Marked as Taking for the 3/20/25 encounter (Office Visit) with Omar Meneses,    Medication Sig Dispense Refill     atorvastatin (LIPITOR) 10 MG tablet Take 1 tablet (10 mg total) by mouth once daily. 30 tablet 11    DULoxetine (CYMBALTA) 30 MG capsule Take 1 capsule (30 mg total) by mouth once daily. 90 capsule 1    fluticasone-salmeterol 250-50 mcg/dose (WIXELA INHUB) 250-50 mcg/dose diskus inhaler Inhale 1 puff into the lungs 2 (two) times a day. Controller 60 each 11    hydrOXYzine pamoate (VISTARIL) 25 MG Cap Take 1 capsule (25 mg total) by mouth every 8 (eight) hours as needed. 60 capsule 1    levothyroxine (SYNTHROID) 50 MCG tablet Take 1 tablet (50 mcg total) by mouth before breakfast. 90 tablet 3    meclizine (ANTIVERT) 25 mg tablet Take 25 mg by mouth as needed for Dizziness.      montelukast (SINGULAIR) 10 mg tablet SMARTSI Tablet(s) By Mouth Every Evening (Patient taking differently: SMARTSI Tablet(s) By Mouth Every Evening  As needed) 30 tablet 11    multivitamin capsule Take 1 capsule by mouth once daily.      omeprazole (PRILOSEC) 40 MG capsule TAKE 1 CAPSULE(40 MG) BY MOUTH DAILY 30 capsule 5    ondansetron (ZOFRAN-ODT) 4 MG TbDL Take 1 tablet (4 mg total) by mouth every 8 (eight) hours as needed (nausea). 15 tablet 0    tiotropium (SPIRIVA) 18 mcg inhalation capsule Inhale 18 mcg into the lungs once daily. Controller      triamcinolone (NASACORT) 55 mcg nasal inhaler 1 spray by Nasal route every evening. 16.9 mL 11       Social History[1]     Family History   Problem Relation Name Age of Onset    Breast cancer Mother Eugenia         Cause of death    Diabetes Mellitus Mother Eugenia     Coronary artery disease Mother Eugenia     Hypertension Mother Eugenia     Thyroid disease Mother Eugenia     Arthritis Mother Eugenia     Heart disease Mother Eugenia     Coronary artery disease Father          Cause of death    Breast cancer Sister Leatha     Cancer Sister Leatha     Vision loss Maternal Grandfather Phi         Patient Care Team:  Omar Meneses DO as PCP - General (Family Medicine)  Aditya Anton Jr.,  "MD, [facility] as Consulting Physician (Pulmonary Disease)  Thomas Vasquez MD as Consulting Physician (Cardiology)  Stewart Sinclair MD as Consulting Physician (Urology)  Jet Sheppard MD as Consulting Physician (Gastroenterology)  Magdaleno Garcia MD as Consulting Physician (Orthopedic Surgery)  Mikael Gomez Jr., MD as Consulting Physician (Otolaryngology)     Subjective:     Review of Systems   Musculoskeletal:  Positive for joint pain.       See HPI for details  All Other ROS: Negative except as stated in HPI.       Objective:     /72 (BP Location: Left arm, Patient Position: Sitting)   Pulse 70   Temp 97.8 °F (36.6 °C)   Resp 18   Ht 5' 1" (1.549 m)   Wt 64.3 kg (141 lb 12.8 oz)   SpO2 99%   BMI 26.79 kg/m²     Physical Exam  Vitals reviewed.   Constitutional:       General: She is not in acute distress.     Appearance: Normal appearance.   Cardiovascular:      Rate and Rhythm: Normal rate and regular rhythm.      Heart sounds: No murmur heard.     No friction rub. No gallop.   Pulmonary:      Effort: No respiratory distress.      Breath sounds: No wheezing, rhonchi or rales.   Musculoskeletal:         General: No swelling, tenderness or deformity.      Right lower leg: No edema.      Left lower leg: No edema.   Skin:     General: Skin is warm and dry.      Findings: No lesion or rash.   Neurological:      General: No focal deficit present.      Mental Status: She is alert.   Psychiatric:         Mood and Affect: Mood normal.         Assessment/Plan:     1. Central retinal vein occlusion, left eye, stable  -followed by specialist. Symptoms have improved.     2. Atherosclerotic heart disease of native coronary artery with other forms of angina pectoris  -well controlled on atorvastatin 10mg daily   -no recent chest pain reported  3. Pseudopolyposis of colon, unspecified complication status, unspecified part of colon  -patient previously followed by GI. Asymptomatic. Currently at age where " risk of bowel prep and anesthesia likely outweigh benefit of screening colonoscopies.     4. Episode of recurrent major depressive disorder, unspecified depression episode severity  -well controlled with duloxetine 30mg daily   5. Aortic atherosclerosis  On atorvastatin 10mg daily   6. Arthritis  Recommended tylenol arthritis 650mg TID        Assessment & Plan             This note was generated with the assistance of ambient listening technology. Verbal consent was obtained by the patient and accompanying visitor(s) for the recording of patient appointment to facilitate this note. I attest to having reviewed and edited the generated note for accuracy, though some syntax or spelling errors may persist. Please contact the author of this note for any clarification.     Follow up:     Follow up in about 6 months (around 9/20/2025) for Medicare Wellness. In addition to their scheduled follow up, the patient has also been instructed to follow up on as needed basis.            [1]   Social History  Socioeconomic History    Marital status:    Tobacco Use    Smoking status: Never     Passive exposure: Never    Smokeless tobacco: Never   Substance and Sexual Activity    Alcohol use: Never    Drug use: Never    Sexual activity: Not Currently     Birth control/protection: See Surgical Hx     Social Drivers of Health     Financial Resource Strain: Low Risk  (3/13/2025)    Overall Financial Resource Strain (CARDIA)     Difficulty of Paying Living Expenses: Not very hard   Food Insecurity: No Food Insecurity (3/13/2025)    Hunger Vital Sign     Worried About Running Out of Food in the Last Year: Never true     Ran Out of Food in the Last Year: Never true   Transportation Needs: No Transportation Needs (3/13/2025)    PRAPARE - Transportation     Lack of Transportation (Medical): No     Lack of Transportation (Non-Medical): No   Physical Activity: Insufficiently Active (3/13/2025)    Exercise Vital Sign     Days of Exercise  per Week: 3 days     Minutes of Exercise per Session: 10 min   Stress: No Stress Concern Present (3/13/2025)    [employer] - Occupational Stress Questionnaire     Feeling of Stress : Only a little   Housing Stability: Low Risk  (3/13/2025)    Housing Stability Vital Sign     Unable to Pay for Housing in the Last Year: No     Number of Times Moved in the Last Year: 0     Homeless in the Last Year: No

## 2025-05-12 DIAGNOSIS — K21.9 GASTROESOPHAGEAL REFLUX DISEASE, UNSPECIFIED WHETHER ESOPHAGITIS PRESENT: ICD-10-CM

## 2025-05-12 RX ORDER — OMEPRAZOLE 40 MG/1
CAPSULE, DELAYED RELEASE ORAL
Qty: 30 CAPSULE | Refills: 5 | Status: SHIPPED | OUTPATIENT
Start: 2025-05-12

## 2025-06-05 DIAGNOSIS — R42 VERTIGO: Primary | ICD-10-CM

## 2025-06-17 DIAGNOSIS — K21.9 GASTROESOPHAGEAL REFLUX DISEASE, UNSPECIFIED WHETHER ESOPHAGITIS PRESENT: ICD-10-CM

## 2025-06-17 RX ORDER — OMEPRAZOLE 40 MG/1
CAPSULE, DELAYED RELEASE ORAL
Qty: 30 CAPSULE | Refills: 5 | Status: SHIPPED | OUTPATIENT
Start: 2025-06-17

## 2025-06-23 NOTE — TELEPHONE ENCOUNTER
Problem: Occupational Therapy  Goal: Occupational Therapy Goal  Description: Goals to be met by: in 1 month      Patient will increase functional independence with ADLs by performing:    UE Dressing with Minimal Assistance.  LE Dressing with Minimal Assistance.  Grooming while seated at sink with Modified [place].  Toileting from toilet with Modified [place] for hygiene and clothing management.   Toilet transfer to bedside commode with Modified [place].    Outcome: Progressing      ----- Message from Maya Herrera sent at 11/21/2023  2:14 PM CST -----  Regarding: refill  omeprazole (PRILOSEC) 40 MG capsule

## 2025-08-07 DIAGNOSIS — E78.2 MIXED HYPERLIPIDEMIA: ICD-10-CM

## 2025-08-07 RX ORDER — ATORVASTATIN CALCIUM 10 MG/1
10 TABLET, FILM COATED ORAL
Qty: 30 TABLET | Refills: 11 | Status: SHIPPED | OUTPATIENT
Start: 2025-08-07

## 2025-08-19 DIAGNOSIS — Z11.4 SCREENING FOR HIV (HUMAN IMMUNODEFICIENCY VIRUS): ICD-10-CM

## 2025-08-19 DIAGNOSIS — E03.9 HYPOTHYROIDISM, UNSPECIFIED TYPE: ICD-10-CM

## 2025-08-19 DIAGNOSIS — Z11.59 NEED FOR HEPATITIS C SCREENING TEST: ICD-10-CM

## 2025-08-19 DIAGNOSIS — Z00.00 WELLNESS EXAMINATION: Primary | ICD-10-CM

## 2025-08-19 DIAGNOSIS — R73.03 PREDIABETES: ICD-10-CM

## 2025-08-19 DIAGNOSIS — E78.2 MIXED HYPERLIPIDEMIA: Chronic | ICD-10-CM
